# Patient Record
Sex: FEMALE | Race: WHITE | Employment: OTHER | URBAN - METROPOLITAN AREA
[De-identification: names, ages, dates, MRNs, and addresses within clinical notes are randomized per-mention and may not be internally consistent; named-entity substitution may affect disease eponyms.]

---

## 2017-02-09 ENCOUNTER — HOSPITAL ENCOUNTER (OUTPATIENT)
Dept: MAMMOGRAPHY | Age: 72
Discharge: HOME | End: 2017-02-09
Attending: SPECIALIST
Payer: MEDICARE

## 2017-02-09 DIAGNOSIS — Z12.31 VISIT FOR SCREENING MAMMOGRAM: ICD-10-CM

## 2017-02-09 PROCEDURE — 77067 SCR MAMMO BI INCL CAD: CPT

## 2018-02-15 ENCOUNTER — HOSPITAL ENCOUNTER (OUTPATIENT)
Dept: MAMMOGRAPHY | Age: 73
Discharge: HOME | End: 2018-02-15
Attending: INTERNAL MEDICINE
Payer: MEDICARE

## 2018-02-15 DIAGNOSIS — Z12.31 VISIT FOR SCREENING MAMMOGRAM: ICD-10-CM

## 2018-02-15 PROCEDURE — 77063 BREAST TOMOSYNTHESIS BI: CPT

## 2019-02-25 ENCOUNTER — HOSPITAL ENCOUNTER (OUTPATIENT)
Dept: MAMMOGRAPHY | Age: 74
Discharge: HOME | End: 2019-02-25
Attending: INTERNAL MEDICINE
Payer: MEDICARE

## 2019-02-25 DIAGNOSIS — Z12.39 BREAST SCREENING: ICD-10-CM

## 2019-02-25 PROCEDURE — 77063 BREAST TOMOSYNTHESIS BI: CPT

## 2020-09-03 ENCOUNTER — HOSPITAL ENCOUNTER (OUTPATIENT)
Dept: MAMMOGRAPHY | Age: 75
Discharge: HOME | End: 2020-09-03
Attending: INTERNAL MEDICINE
Payer: MEDICARE

## 2020-09-03 DIAGNOSIS — Z12.31 VISIT FOR SCREENING MAMMOGRAM: ICD-10-CM

## 2020-09-03 PROCEDURE — 77063 BREAST TOMOSYNTHESIS BI: CPT

## 2021-10-14 ENCOUNTER — TRANSCRIBE ORDER (OUTPATIENT)
Dept: SCHEDULING | Age: 76
End: 2021-10-14

## 2021-10-14 DIAGNOSIS — Z12.31 SCREENING MAMMOGRAM FOR HIGH-RISK PATIENT: Primary | ICD-10-CM

## 2021-11-16 ENCOUNTER — HOSPITAL ENCOUNTER (OUTPATIENT)
Dept: MAMMOGRAPHY | Age: 76
Discharge: HOME | End: 2021-11-16
Attending: INTERNAL MEDICINE
Payer: MEDICARE

## 2021-11-16 DIAGNOSIS — Z12.31 SCREENING MAMMOGRAM FOR HIGH-RISK PATIENT: ICD-10-CM

## 2021-11-16 PROCEDURE — 77063 BREAST TOMOSYNTHESIS BI: CPT

## 2022-01-18 ENCOUNTER — OFFICE VISIT (OUTPATIENT)
Dept: ORTHOPEDIC SURGERY | Age: 77
End: 2022-01-18
Payer: MEDICARE

## 2022-01-18 VITALS — WEIGHT: 149 LBS | HEIGHT: 63 IN | BODY MASS INDEX: 26.4 KG/M2

## 2022-01-18 DIAGNOSIS — M70.62 TROCHANTERIC BURSITIS OF LEFT HIP: ICD-10-CM

## 2022-01-18 DIAGNOSIS — M54.42 ACUTE BACK PAIN WITH SCIATICA, LEFT: ICD-10-CM

## 2022-01-18 DIAGNOSIS — M25.552 ACUTE PAIN OF LEFT HIP: Primary | ICD-10-CM

## 2022-01-18 PROCEDURE — 3017F COLORECTAL CA SCREEN DOC REV: CPT | Performed by: ORTHOPAEDIC SURGERY

## 2022-01-18 PROCEDURE — 1090F PRES/ABSN URINE INCON ASSESS: CPT | Performed by: ORTHOPAEDIC SURGERY

## 2022-01-18 PROCEDURE — G8419 CALC BMI OUT NRM PARAM NOF/U: HCPCS | Performed by: ORTHOPAEDIC SURGERY

## 2022-01-18 PROCEDURE — G8432 DEP SCR NOT DOC, RNG: HCPCS | Performed by: ORTHOPAEDIC SURGERY

## 2022-01-18 PROCEDURE — G8400 PT W/DXA NO RESULTS DOC: HCPCS | Performed by: ORTHOPAEDIC SURGERY

## 2022-01-18 PROCEDURE — 99203 OFFICE O/P NEW LOW 30 MIN: CPT | Performed by: ORTHOPAEDIC SURGERY

## 2022-01-18 PROCEDURE — G8427 DOCREV CUR MEDS BY ELIG CLIN: HCPCS | Performed by: ORTHOPAEDIC SURGERY

## 2022-01-18 PROCEDURE — G8536 NO DOC ELDER MAL SCRN: HCPCS | Performed by: ORTHOPAEDIC SURGERY

## 2022-01-18 PROCEDURE — 1101F PT FALLS ASSESS-DOCD LE1/YR: CPT | Performed by: ORTHOPAEDIC SURGERY

## 2022-01-18 NOTE — LETTER
1/18/2022    Patient: Nanci Mares   YOB: 1945   Date of Visit: 0/54/7372     Sylvia Hammond MD  [facility] [place] [place] [address]  Via Fax: [phone]    Dear Sylvia Hammond MD,      Thank you for referring Ms. Last Milian to [facility] for evaluation. My notes for this consultation are attached. If you have questions, please do not hesitate to call me. I look forward to following your patient along with you.       Sincerely,    Cande Cox MD

## 2022-01-18 NOTE — PATIENT INSTRUCTIONS
Trochanteric Bursitis: Exercises  Introduction  Here are some examples of exercises for you to try. The exercises may be suggested for a condition or for rehabilitation. Start each exercise slowly. Ease off the exercises if you start to have pain. You will be told when to start these exercises and which ones will work best for you. How to do the exercises  Hamstring wall stretch    1. Lie on your back in a doorway, with your good leg through the open door. 2. Slide your affected leg up the wall to straighten your knee. You should feel a gentle stretch down the back of your leg. 3. Hold the stretch for at least 1 minute to begin. Then try to lengthen the time you hold the stretch to as long as 6 minutes. 4. Repeat 2 to 4 times. 5. If you do not have a place to do this exercise in a doorway, there is another way to do it:  6. Lie on your back, and bend the knee of your affected leg. 7. Loop a towel under the ball and toes of that foot, and hold the ends of the towel in your hands. 8. Straighten your knee, and slowly pull back on the towel. You should feel a gentle stretch down the back of your leg. 9. Hold the stretch for 15 to 30 seconds. Or even better, hold the stretch for 1 minute if you can. 10. Repeat 2 to 4 times. 1. Do not arch your back. 2. Do not bend either knee. 3. Keep one heel touching the floor and the other heel touching the wall. Do not point your toes. Straight-leg raises to the outside    1. Lie on your side, with your affected leg on top. 2. Tighten the front thigh muscles of your top leg to keep your knee straight. 3. Keep your hip and your leg straight in line with the rest of your body, and keep your knee pointing forward. Do not drop your hip back. 4. Lift your top leg straight up toward the ceiling, about 12 inches off the floor. Hold for about 6 seconds, then slowly lower your leg. 5. Repeat 8 to 12 times. Clamshell    1.  Lie on your side, with your affected leg on top and your head propped on a pillow. Keep your feet and knees together and your knees bent. 2. Raise your top knee, but keep your feet together. Do not let your hips roll back. Your legs should open up like a clamshell. 3. Hold for 6 seconds. 4. Slowly lower your knee back down. Rest for 10 seconds. 5. Repeat 8 to 12 times. Standing quadriceps stretch    1. If you are not steady on your feet, hold on to a chair, counter, or wall. You can also lie on your stomach or your side to do this exercise. 2. Bend the knee of the leg you want to stretch, and reach behind you to grab the front of your foot or ankle with the hand on the same side. For example, if you are stretching your right leg, use your right hand. 3. Keeping your knees next to each other, pull your foot toward your buttock until you feel a gentle stretch across the front of your hip and down the front of your thigh. Your knee should be pointed directly to the ground, and not out to the side. 4. Hold the stretch for 15 to 30 seconds. 5. Repeat 2 to 4 times. Piriformis stretch    1. Lie on your back with your legs straight. 2. Lift your affected leg and bend your knee. With your opposite hand, reach across your body, and then gently pull your knee toward your opposite shoulder. 3. Hold the stretch for 15 to 30 seconds. 4. Repeat 2 to 4 times. Double knee-to-chest    1. Lie on your back with your knees bent and your feet flat on the floor. You can put a small pillow under your head and neck if it is more comfortable. 2. Bring both knees to your chest.  3. Keep your lower back pressed to the floor. Hold for 15 to 30 seconds. 4. Relax, and lower your knees to the starting position. 5. Repeat 2 to 4 times. Follow-up care is a key part of your treatment and safety. Be sure to make and go to all appointments, and call your doctor if you are having problems.  It's also a good idea to know your test results and keep a list of the medicines you take.  Where can you learn more? Go to http://www.gray.com/  Enter N503 in the search box to learn more about \"Trochanteric Bursitis: Exercises. \"  Current as of: July 1, 2021               Content Version: 13.0  © 7568-9840 [employer], [employer]. Care instructions adapted under license by [employer] (which disclaims liability or warranty for this information). If you have questions about a medical condition or this instruction, always ask your healthcare professional. [address] any warranty or liability for your use of this information.

## 2022-01-18 NOTE — PROGRESS NOTES
Calin Carty (: 1945) is a 78 y.o. female, patient, here for evaluation of the following chief complaint(s):  Hip Pain (left)       HPI:    Chief complaint is left hip pain. 3 weeks left hip pain. Feels it in her buttock possibly into her lateral hip. Does not really bother her walking. Seems to be resolving. No Known Allergies    Current Outpatient Medications   Medication Sig    naproxen sodium 220 mg cap Take 220 mg by mouth daily.  VITAMIN B COMPLEX PO Take  by mouth. Takes one po occasionally. Super B complex.  levothyroxine (SYNTHROID) 50 mcg tablet Take 50 mcg by mouth Daily (before breakfast).  ranitidine (ZANTAC) 150 mg tablet Take 150 mg by mouth two (2) times a day. No current facility-administered medications for this visit.        Past Medical History:   Diagnosis Date    GERD (gastroesophageal reflux disease)     Hypothyroidism     Intraductal papilloma 2012    Migraine     RA (rheumatoid arthritis) (HCC)     remission        Past Surgical History:   Procedure Laterality Date    HX BREAST BIOPSY Right     benign surgical- papilloma    HX BREAST BIOPSY Right 2012    ultrasound core benign-papilloma    HX  SECTION  1983    HX DILATION AND CURETTAGE      HX OTHER SURGICAL      [place] RECTAL ABCESS    HX OTHER SURGICAL      last colonoscopy -        Family History   Problem Relation Age of Onset    Thyroid Disease Mother     Alzheimer's Disease Mother     Other Mother         pneumonia    Cancer Father         PROSTATE    Cancer Paternal Grandmother         THYROID    Diabetes Son         Social History     Socioeconomic History    Marital status:      Spouse name: Not on file    Number of children: Not on file    Years of education: Not on file    Highest education level: Not on file   Occupational History    Not on file   Tobacco Use    Smoking status: Former Smoker    Smokeless tobacco: Never Used    Tobacco comment: quit at age 29 yrs   Substance and Sexual Activity    Alcohol use: Yes     Comment: OCCASIONAL    Drug use: Not on file    Sexual activity: Not on file   Other Topics Concern    Not on file   Social History Narrative    Not on file     Social Determinants of Health     Financial Resource Strain:     Difficulty of Paying Living Expenses: Not on file   Food Insecurity:     Worried About Running Out of Food in the Last Year: Not on file    Re of Food in the Last Year: Not on file   Transportation Needs:     Lack of Transportation (Medical): Not on file    Lack of Transportation (Non-Medical): Not on file   Physical Activity:     Days of Exercise per Week: Not on file    Minutes of Exercise per Session: Not on file   Stress:     Feeling of Stress : Not on file   Social Connections:     Frequency of Communication with Friends and Family: Not on file    Frequency of Social Gatherings with Friends and Family: Not on file    Attends Zoroastrian Services: Not on file    Active Member of [address] [place] [address] or Organizations: Not on file    Attends Club or Organization Meetings: Not on file    Marital Status: Not on file   Intimate Partner Violence:     Fear of Current or Ex-Partner: Not on file    Emotionally Abused: Not on file    Physically Abused: Not on file    Sexually Abused: Not on file   Housing Stability:     Unable to Pay for Housing in the Last Year: Not on file    Number of [place] in the Last Year: Not on file    Unstable Housing in the Last Year: Not on file             Vitals:  Ht 5' 3\" (1.6 m)   Wt 149 lb (67.6 kg)   BMI 26.39 kg/m²    Body mass index is 26.39 kg/m². PHYSICAL EXAM:  Patient is well-developed well-nourished and in no acute distress. Patient ambulates with a normal gait. No focal neurologic abnormalities.     Left lower extremity exam: Hip exam today reveals negative logroll test and negative impingement test.  Hip range of motion is full extension to 115 degrees of flexion. There is mild trochanteric tenderness. Hip flexors and abductors have 5 x 5 strength. Patient able to stand on the affected lower extremity with negative Trendelenburg. Extremity has intact quads, ankle plantar flexors, ankle dorsiflexors. Skin is intact. Foot is sensate and well perfused. Straight leg raise and femoral nerve stretch test are negative. Right lower extremity exam: Hip exam today reveals negative logroll test and negative impingement test.  Hip range of motion is full extension to 115 degrees of flexion. There is no trochanteric tenderness. Hip flexors and abductors have 5 x 5 strength. Patient able to stand on the affected lower extremity with negative Trendelenburg. Extremity has intact quads, ankle plantar flexors, ankle dorsiflexors. Skin is intact. Foot is sensate and well perfused. Straight leg raise and femoral nerve stretch test are negative. IMAGING:  XR Results (most recent):  Results from Appointment encounter on 01/18/22    XR HIP LT W OR WO PELV 2-3 VWS    Narrative  3 x-ray views of the left hip. AP pelvis, lateral left hip, AP left hip. Joint space well-maintained small posterior inferior osteophyte noted. No acute issues. Lumbar degenerative disc space narrowing. ASSESSMENT/PLAN:  1. Acute pain of left hip  -     XR HIP LT W OR WO PELV 2-3 VWS; Future  2. Trochanteric bursitis of left hip  3. Acute back pain with sciatica, left    Reassured the patient that there are no abnormal findings on her x-rays. She will use some over-the-counter medications and home stretching program.  Follow-up as needed. An electronic signature was used to authenticate this note.   --Carolyn Méndez MD

## 2022-11-01 ENCOUNTER — TRANSCRIBE ORDER (OUTPATIENT)
Dept: SCHEDULING | Age: 77
End: 2022-11-01

## 2022-11-01 DIAGNOSIS — Z12.31 SCREENING MAMMOGRAM FOR HIGH-RISK PATIENT: Primary | ICD-10-CM

## 2023-01-06 ENCOUNTER — APPOINTMENT (OUTPATIENT)
Dept: CT IMAGING | Age: 78
End: 2023-01-06
Attending: EMERGENCY MEDICINE
Payer: MEDICARE

## 2023-01-06 ENCOUNTER — HOSPITAL ENCOUNTER (EMERGENCY)
Age: 78
Discharge: HOME | End: 2023-01-06
Attending: EMERGENCY MEDICINE
Payer: MEDICARE

## 2023-01-06 ENCOUNTER — APPOINTMENT (OUTPATIENT)
Dept: CT IMAGING | Age: 78
End: 2023-01-06
Payer: MEDICARE

## 2023-01-06 VITALS
WEIGHT: 146.61 LBS | SYSTOLIC BLOOD PRESSURE: 122 MMHG | TEMPERATURE: 97.8 F | HEIGHT: 62 IN | BODY MASS INDEX: 26.98 KG/M2 | DIASTOLIC BLOOD PRESSURE: 51 MMHG | OXYGEN SATURATION: 98 % | RESPIRATION RATE: 18 BRPM | HEART RATE: 55 BPM

## 2023-01-06 DIAGNOSIS — T14.8XXA ABRASION: ICD-10-CM

## 2023-01-06 DIAGNOSIS — S09.90XA CLOSED HEAD INJURY, INITIAL ENCOUNTER: Primary | ICD-10-CM

## 2023-01-06 PROCEDURE — 72125 CT NECK SPINE W/O DYE: CPT

## 2023-01-06 PROCEDURE — 70486 CT MAXILLOFACIAL W/O DYE: CPT

## 2023-01-06 PROCEDURE — 99284 EMERGENCY DEPT VISIT MOD MDM: CPT

## 2023-01-06 PROCEDURE — 70450 CT HEAD/BRAIN W/O DYE: CPT

## 2023-01-06 NOTE — ED TRIAGE NOTES
Pt arrives c/o falling today. Pt arrives with friend who witnessed the event. The friend states her foot got caught on the brick next to the sidewalk and fell face forward. Pt and friend deny LOC. -blood thinners. Pt baseline has memory issues and difficulty finding words. No bleeding noted. Some bruising noted to chin.

## 2023-01-07 NOTE — DISCHARGE INSTRUCTIONS
As discussed, your CT of your head, face, and neck were all normal. You may take Ibuprofen 800mg every 6-8 hours as needed for pain. You may also alternate with Tylenol 1000mg every 6-8 hours as needed for pain. You may use over-the-counter ointment such as Neosporin on your chin. Follow-up with your primary care provider for further management. Return to the ER if you experience severe headaches, dizziness, visual changes, changes to your mental status.

## 2023-01-07 NOTE — ED PROVIDER NOTES
The history is provided by the patient and a friend. Fall  Pertinent negatives include no fever, no abdominal pain, no nausea, no vomiting and no headaches. David Shah is a 78 y.o. female with Hx of migraines, RA, and hypothyroidism who presents ambulatory with a friend to [facility] ED with cc of mechanical ground-level fall earlier today. Patient reports toe catching on a brick resulting in ground-level fall, hitting her face on the ground. Fall was witnessed by her friend that is with her today. Patient reports mild intermittent pain in her head and face, no pain currently. She reports mild skin abrasion on chin, applied Neosporin prior to arrival.  Otherwise, no pain or injuries reported. No loss of consciousness, dizziness, chest pain, shortness of breath. Denies use of aspirin or blood thinners. Patient has some difficulty with memory and word finding at baseline, but friend reports no acute change in mental status. PCP: Miranda Thomas MD    There are no other complaints, changes or physical findings at this time.     Past Medical History:   Diagnosis Date    GERD (gastroesophageal reflux disease)     Hypothyroidism     Intraductal papilloma 2012    Migraine     RA (rheumatoid arthritis) ([facility] [address]     remission       Past Surgical History:   Procedure Laterality Date    HX BREAST BIOPSY Right 2012    benign surgical- papilloma    HX BREAST BIOPSY Right 2012    ultrasound core benign-papilloma    HX  SECTION  1983    HX DILATION AND CURETTAGE      HX OTHER SURGICAL      DONNA RECTAL ABCESS    HX OTHER SURGICAL      last colonoscopy -          Family History:   Problem Relation Age of Onset    Thyroid Disease Mother     Alzheimer's Disease Mother     Other Mother         pneumonia    Cancer Father         PROSTATE    Cancer Paternal Grandmother         THYROID    Diabetes Son        Social History     Socioeconomic History    Marital status:      Spouse name: Not on file    Number of children: Not on file    Years of education: Not on file    Highest education level: Not on file   Occupational History    Not on file   Tobacco Use    Smoking status: Former    Smokeless tobacco: Never    Tobacco comments:     quit at age 29 yrs   Substance and Sexual Activity    Alcohol use: Yes     Comment: OCCASIONAL    Drug use: Not on file    Sexual activity: Not on file   Other Topics Concern    Not on file   Social History Narrative    Not on file     Social Determinants of Health     Financial Resource Strain: Not on file   Food Insecurity: Not on file   Transportation Needs: Not on file   Physical Activity: Not on file   Stress: Not on file   Social Connections: Not on file   Intimate Partner Violence: Not on file   Housing Stability: Not on file         ALLERGIES: Patient has no known allergies. Review of Systems   Constitutional:  Negative for activity change, appetite change, chills and fever. HENT:  Negative for congestion, rhinorrhea and sore throat. Respiratory:  Negative for cough and shortness of breath. Cardiovascular:  Negative for chest pain. Gastrointestinal:  Negative for abdominal pain, nausea and vomiting. Genitourinary:  Negative for decreased urine volume and difficulty urinating. Musculoskeletal:  Negative for arthralgias and myalgias. Skin:  Positive for wound. Negative for color change and rash. Neurological:  Negative for light-headedness and headaches. Psychiatric/Behavioral:  Negative for agitation and confusion. The patient is not nervous/anxious. Vitals:    01/06/23 1741   BP: (!) 122/51   Pulse: (!) 55   Resp: 18   Temp: 97.8 °F (36.6 °C)   SpO2: 98%   Weight: 66.5 kg (146 lb 9.7 oz)   Height: 5' 2\" (1.575 m)            Physical Exam  Vitals and nursing note reviewed. Constitutional:       Appearance: Normal appearance. HENT:      Head: Normocephalic. Abrasion (R chin) present. No masses.       Comments: Skull nontender to palpation      Right Ear: External ear normal.      Left Ear: External ear normal.      Nose: Nose normal.      Mouth/Throat:      Mouth: Mucous membranes are moist.   Eyes:      Extraocular Movements: Extraocular movements intact. Conjunctiva/sclera: Conjunctivae normal.      Pupils: Pupils are equal, round, and reactive to light. Cardiovascular:      Rate and Rhythm: Normal rate and regular rhythm. Pulses: Normal pulses. Heart sounds: Normal heart sounds. Pulmonary:      Effort: Pulmonary effort is normal.      Breath sounds: Normal breath sounds. Abdominal:      Palpations: Abdomen is soft. Musculoskeletal:         General: No tenderness, deformity or signs of injury. Normal range of motion. Cervical back: Normal range of motion and neck supple. No rigidity or tenderness. Comments: No tenderness, deformity, or injury to knees, wrists, and shoulders bilaterally. Small bruise on L knee. Skin:     General: Skin is warm and dry. Capillary Refill: Capillary refill takes less than 2 seconds. Neurological:      General: No focal deficit present. Mental Status: She is alert and oriented to person, place, and time. Mental status is at baseline. Motor: No weakness. Coordination: Coordination normal.      Gait: Gait normal.   Psychiatric:         Mood and Affect: Mood normal.         Behavior: Behavior normal.         Thought Content: Thought content normal.         Judgment: Judgment normal.        Medical Decision Making  Ddx: Closed head injury, contusion of chin, concussion    68-year-old female presents after witnessed mechanical ground-level fall today resulting in hitting her face on the ground. On exam, mild skin abrasion on chin which does not require further management. Offered pain medication for head/face pain, patient declined.   Based on age, mechanism of fall and patient's difficulty with memory and word finding at baseline, felt that imaging was warranted. Work-up included CT head, maxillofacial, and cervical spine; all unremarkable. Discussed with attending Dr. Sarah Benjamin who is comfortable with discharge with Tylenol, ibuprofen, strict return precautions, and follow-up with primary care provider. Amount and/or Complexity of Data Reviewed  Independent Historian: friend  Radiology: ordered. Decision-making details documented in ED Course. Procedures    LABORATORY TESTS:  No results found for this or any previous visit (from the past 12 hour(s)). IMAGING RESULTS:  CT MAXILLOFACIAL WO CONT   Final Result   No fracture. CT SPINE CERV WO CONT   Final Result   No acute fracture or subluxation. CT HEAD WO CONT   Final Result   No acute intracranial hemorrhage or infarct. Chronic microangiopathic white matter disease and diffuse parenchymal volume   loss. MEDICATIONS GIVEN:  Medications - No data to display    IMPRESSION:  1. Closed head injury, initial encounter    2. Abrasion        PLAN:  1. Current Discharge Medication List      Recommended Tylenol, ibuprofen, OTC antibiotic ointment    2. Follow-up Information       Follow up With Specialties Details Why Contact Info    [address], [facility] Emergency Medicine Go to  As needed, If symptoms worsen [address] [facility]    Last Fierro MD Internal Medicine Physician Go to  As needed [address] [id number] [phone]            3. Return to ED if worse     Presentation, management, and disposition were discussed with the attending physician, Dr. Sarah Benjamin, who is in agreement with plan of care.

## 2023-01-18 ENCOUNTER — HOSPITAL ENCOUNTER (OUTPATIENT)
Dept: MAMMOGRAPHY | Age: 78
Discharge: HOME | End: 2023-01-18
Attending: INTERNAL MEDICINE
Payer: MEDICARE

## 2023-01-18 DIAGNOSIS — Z12.31 SCREENING MAMMOGRAM FOR HIGH-RISK PATIENT: ICD-10-CM

## 2023-01-18 PROCEDURE — 77063 BREAST TOMOSYNTHESIS BI: CPT

## 2023-02-08 ENCOUNTER — APPOINTMENT (OUTPATIENT)
Dept: CT IMAGING | Age: 78
End: 2023-02-08
Attending: STUDENT IN AN ORGANIZED HEALTH CARE EDUCATION/TRAINING PROGRAM
Payer: MEDICARE

## 2023-02-08 ENCOUNTER — HOSPITAL ENCOUNTER (OUTPATIENT)
Age: 78
Setting detail: OBSERVATION
Discharge: HOME HEALTH | End: 2023-02-09
Attending: STUDENT IN AN ORGANIZED HEALTH CARE EDUCATION/TRAINING PROGRAM | Admitting: INTERNAL MEDICINE
Payer: MEDICARE

## 2023-02-08 DIAGNOSIS — G45.9 TIA (TRANSIENT ISCHEMIC ATTACK): Primary | ICD-10-CM

## 2023-02-08 LAB
ALBUMIN SERPL-MCNC: 4 G/DL (ref 3.5–5)
ALBUMIN/GLOB SERPL: 1 (ref 1.1–2.2)
ALP SERPL-CCNC: 64 U/L (ref 45–117)
ALT SERPL-CCNC: 20 U/L (ref 12–78)
ANION GAP SERPL CALC-SCNC: 10 MMOL/L (ref 5–15)
AST SERPL-CCNC: 25 U/L (ref 15–37)
ATRIAL RATE: 49 BPM
BASOPHILS # BLD: 0.1 K/UL (ref 0–0.1)
BASOPHILS NFR BLD: 1 % (ref 0–1)
BILIRUB SERPL-MCNC: 0.8 MG/DL (ref 0.2–1)
BUN SERPL-MCNC: 13 MG/DL (ref 6–20)
BUN/CREAT SERPL: 15 (ref 12–20)
CALCIUM SERPL-MCNC: 9.5 MG/DL (ref 8.5–10.1)
CALCULATED P AXIS, ECG09: 76 DEGREES
CALCULATED R AXIS, ECG10: 22 DEGREES
CALCULATED T AXIS, ECG11: 64 DEGREES
CHLORIDE SERPL-SCNC: 103 MMOL/L (ref 97–108)
CO2 SERPL-SCNC: 26 MMOL/L (ref 21–32)
CREAT SERPL-MCNC: 0.85 MG/DL (ref 0.55–1.02)
DIAGNOSIS, 93000: NORMAL
DIFFERENTIAL METHOD BLD: NORMAL
EOSINOPHIL # BLD: 0.3 K/UL (ref 0–0.4)
EOSINOPHIL NFR BLD: 4 % (ref 0–7)
ERYTHROCYTE [DISTWIDTH] IN BLOOD BY AUTOMATED COUNT: 13.2 % (ref 11.5–14.5)
GLOBULIN SER CALC-MCNC: 4 G/DL (ref 2–4)
GLUCOSE BLD STRIP.AUTO-MCNC: 99 MG/DL (ref 65–117)
GLUCOSE SERPL-MCNC: 95 MG/DL (ref 65–100)
HCT VFR BLD AUTO: 39.1 % (ref 35–47)
HGB BLD-MCNC: 12.8 G/DL (ref 11.5–16)
IMM GRANULOCYTES # BLD AUTO: 0 K/UL (ref 0–0.04)
IMM GRANULOCYTES NFR BLD AUTO: 0 % (ref 0–0.5)
INR PPP: 1 (ref 0.9–1.1)
LYMPHOCYTES # BLD: 2 K/UL (ref 0.8–3.5)
LYMPHOCYTES NFR BLD: 30 % (ref 12–49)
MCH RBC QN AUTO: 30.1 PG (ref 26–34)
MCHC RBC AUTO-ENTMCNC: 32.7 G/DL (ref 30–36.5)
MCV RBC AUTO: 92 FL (ref 80–99)
MONOCYTES # BLD: 0.5 K/UL (ref 0–1)
MONOCYTES NFR BLD: 8 % (ref 5–13)
NEUTS SEG # BLD: 3.9 K/UL (ref 1.8–8)
NEUTS SEG NFR BLD: 57 % (ref 32–75)
NRBC # BLD: 0 K/UL (ref 0–0.01)
NRBC BLD-RTO: 0 PER 100 WBC
P-R INTERVAL, ECG05: 134 MS
PLATELET # BLD AUTO: 298 K/UL (ref 150–400)
PMV BLD AUTO: 9.7 FL (ref 8.9–12.9)
POTASSIUM SERPL-SCNC: 4.2 MMOL/L (ref 3.5–5.1)
PROT SERPL-MCNC: 8 G/DL (ref 6.4–8.2)
PROTHROMBIN TIME: 10.2 SEC (ref 9–11.1)
Q-T INTERVAL, ECG07: 446 MS
QRS DURATION, ECG06: 70 MS
QTC CALCULATION (BEZET), ECG08: 402 MS
RBC # BLD AUTO: 4.25 M/UL (ref 3.8–5.2)
SERVICE CMNT-IMP: NORMAL
SODIUM SERPL-SCNC: 139 MMOL/L (ref 136–145)
VENTRICULAR RATE, ECG03: 49 BPM
WBC # BLD AUTO: 6.8 K/UL (ref 3.6–11)

## 2023-02-08 PROCEDURE — 74011250636 HC RX REV CODE- 250/636: Performed by: STUDENT IN AN ORGANIZED HEALTH CARE EDUCATION/TRAINING PROGRAM

## 2023-02-08 PROCEDURE — 99285 EMERGENCY DEPT VISIT HI MDM: CPT

## 2023-02-08 PROCEDURE — 74011250637 HC RX REV CODE- 250/637: Performed by: STUDENT IN AN ORGANIZED HEALTH CARE EDUCATION/TRAINING PROGRAM

## 2023-02-08 PROCEDURE — 74011000636 HC RX REV CODE- 636: Performed by: STUDENT IN AN ORGANIZED HEALTH CARE EDUCATION/TRAINING PROGRAM

## 2023-02-08 PROCEDURE — 36415 COLL VENOUS BLD VENIPUNCTURE: CPT

## 2023-02-08 PROCEDURE — 80053 COMPREHEN METABOLIC PANEL: CPT

## 2023-02-08 PROCEDURE — 70450 CT HEAD/BRAIN W/O DYE: CPT

## 2023-02-08 PROCEDURE — 94762 N-INVAS EAR/PLS OXIMTRY CONT: CPT

## 2023-02-08 PROCEDURE — 93005 ELECTROCARDIOGRAM TRACING: CPT

## 2023-02-08 PROCEDURE — 85025 COMPLETE CBC W/AUTO DIFF WBC: CPT

## 2023-02-08 PROCEDURE — 85610 PROTHROMBIN TIME: CPT

## 2023-02-08 PROCEDURE — G0378 HOSPITAL OBSERVATION PER HR: HCPCS

## 2023-02-08 PROCEDURE — 70496 CT ANGIOGRAPHY HEAD: CPT

## 2023-02-08 PROCEDURE — 82962 GLUCOSE BLOOD TEST: CPT

## 2023-02-08 RX ORDER — CLOPIDOGREL BISULFATE 75 MG/1
300 TABLET ORAL
Status: COMPLETED | OUTPATIENT
Start: 2023-02-08 | End: 2023-02-08

## 2023-02-08 RX ORDER — GUAIFENESIN 100 MG/5ML
325 LIQUID (ML) ORAL
Status: COMPLETED | OUTPATIENT
Start: 2023-02-08 | End: 2023-02-08

## 2023-02-08 RX ADMIN — CLOPIDOGREL BISULFATE 300 MG: 75 TABLET ORAL at 12:48

## 2023-02-08 RX ADMIN — SODIUM CHLORIDE 1000 ML: 9 INJECTION, SOLUTION INTRAVENOUS at 12:50

## 2023-02-08 RX ADMIN — IOPAMIDOL 100 ML: 755 INJECTION, SOLUTION INTRAVENOUS at 12:37

## 2023-02-08 RX ADMIN — ASPIRIN 324 MG: 81 TABLET, CHEWABLE ORAL at 12:47

## 2023-02-08 NOTE — ED NOTES
SBAR report given to [facility]. Patient alert, oriented, and in no distress at time of transport.

## 2023-02-08 NOTE — ED TRIAGE NOTES
Pt arrives with sudden onset @ 11:30 of R sided jaw weakness, R arm numbness and weakness as well as R leg numbness and weakness. Pt with floppy extremities to R arm and R leg that improved during CT scan and is back to normal , per pt and family member at bedside.  Pt has baseline dysphagia which has not gotten worse and pt has had \"for years\"

## 2023-02-08 NOTE — ED NOTES
1130: Last known normal @ 1130.  1145: level 1 code S called  1145: Dr. Kirsten Ruiz at bedside  [phone]: pt taken to CT scan.

## 2023-02-08 NOTE — ED NOTES
TRANSFER - OUT REPORT:    Verbal report given to Juliette Briggs RN (name) on [place]  being transferred to NSTU (unit) for routine progression of care       Report consisted of patients Situation, Background, Assessment and   Recommendations(SBAR). Information from the following report(s) SBAR, ED Summary, MAR, Recent Results, and Cardiac Rhythm Sinus Bradycardia  was reviewed with the receiving nurse. Lines:   Peripheral IV 02/08/23 Left Antecubital (Active)        Opportunity for questions and clarification was provided.       Patient transported with:   Monitor

## 2023-02-08 NOTE — ED PROVIDER NOTES
AlsoHPI     Date of Service:  2023    Patient:  Mk Houser    Chief Complaint:  Weakness       HPI:  kM Houser is a 70 y.o.  female with a past medical history of hypothyroidism, RA, migraines, GERD who presents for evaluation of weakness. Patient notes approximately 20 minutes prior to arrival she developed sudden onset right-sided weakness and paresthesias, LKW approx 11:15am.  Denies any pain complaints. No other recent illness.       Past Medical History:   Diagnosis Date    GERD (gastroesophageal reflux disease)     Hypothyroidism     Intraductal papilloma 2012    Migraine     RA (rheumatoid arthritis) (HCC)     remission       Past Surgical History:   Procedure Laterality Date    HX BREAST BIOPSY Right 2012    benign surgical- papilloma    HX BREAST BIOPSY Right 2012    ultrasound core benign-papilloma    HX  SECTION  1983    HX DILATION AND CURETTAGE      HX OTHER SURGICAL      DONNA RECTAL ABCESS    HX OTHER SURGICAL      last colonoscopy -          Family History:   Problem Relation Age of Onset    Thyroid Disease Mother     Alzheimer's Disease Mother     Other Mother         pneumonia    Cancer Father         PROSTATE    Cancer Paternal Grandmother         THYROID    Diabetes Son        Social History     Socioeconomic History    Marital status:      Spouse name: Not on file    Number of children: Not on file    Years of education: Not on file    Highest education level: Not on file   Occupational History    Not on file   Tobacco Use    Smoking status: Former    Smokeless tobacco: Never    Tobacco comments:     quit at age 29 yrs   Substance and Sexual Activity    Alcohol use: Yes     Comment: OCCASIONAL    Drug use: Not on file    Sexual activity: Not on file   Other Topics Concern    Not on file   Social History Narrative    Not on file     Social Determinants of Health     Financial Resource Strain: Not on file   Food Insecurity: Not on file Transportation Needs: Not on file   Physical Activity: Not on file   Stress: Not on file   Social Connections: Not on file   Intimate Partner Violence: Not on file   Housing Stability: Not on file         ALLERGIES: Patient has no known allergies. Review of Systems   Constitutional:  Negative for chills and fever. HENT:  Negative for congestion and rhinorrhea. Eyes:  Negative for discharge and redness. Respiratory:  Negative for cough and shortness of breath. Gastrointestinal:  Negative for abdominal pain, diarrhea, nausea and vomiting. Neurological:  Positive for speech difficulty and weakness. Psychiatric/Behavioral:  Negative for agitation and confusion. There were no vitals filed for this visit. Physical Exam  Vitals and nursing note reviewed. Constitutional:       General: She is not in acute distress. Appearance: Normal appearance. She is not ill-appearing or toxic-appearing. HENT:      Head: Normocephalic and atraumatic. Eyes:      General: No scleral icterus. Extraocular Movements: Extraocular movements intact. Conjunctiva/sclera: Conjunctivae normal.      Pupils: Pupils are equal, round, and reactive to light. Cardiovascular:      Rate and Rhythm: Normal rate. Pulses: Normal pulses. Pulmonary:      Effort: Pulmonary effort is normal. No respiratory distress. Abdominal:      General: Abdomen is flat. Palpations: Abdomen is soft. Tenderness: There is no abdominal tenderness. Musculoskeletal:         General: Normal range of motion. Skin:     General: Skin is warm and dry. Capillary Refill: Capillary refill takes less than 2 seconds. Neurological:      Mental Status: She is alert and oriented to person, place, and time.       Comments: RESULT SUMMARY:  2 points  NIH Stroke Scale      INPUTS:  1A: Level of consciousness -> 0 = Alert; keenly responsive  1B: Ask month and age -> 0 = Both questions right  1C: 'Blink eyes' & 'squeeze hands' -> 0 = Performs both tasks  2: Horizontal extraocular movements -> 0 = Normal  3: Visual fields -> 0 = No visual loss  4: Facial palsy -> 0 = Normal symmetry  5A: Left arm motor drift -> 0 = No drift for 10 seconds  5B: Right arm motor drift -> 1 = Drift, but doesn't hit bed  6A: Left leg motor drift -> 0 = No drift for 5 seconds  6B: Right leg motor drift -> 0 = No drift for 5 seconds  7: Limb Ataxia -> 0 = No ataxia  8: Sensation -> 0 = Normal; no sensory loss  9: Language/aphasia -> 1 = Mild-moderate aphasia: some obvious changes, without significant limitation  10: Dysarthria -> 0 = Normal  11: Extinction/inattention -> 0 = No abnormality   Psychiatric:         Mood and Affect: Mood normal.         Behavior: Behavior normal.        Medical Decision Making      DECISION MAKING:  Patrick Aggarwal is a 70 y.o. female who comes in as above. On arrival, blood pressure is stable at 135/85. Point-of-care glucose on arrival is normal at 99. On my examination she does have some mild aphasia, there is right upper extremity pronator drift. NIHSS is 2. Patient was activated as a level 1 code stroke. She is within the window for TNK but will discuss with tele neurology given her low NIHSS score. I spoke with Dr. Jolly Nuñez, tele neuro, who will evaluate the patient. When patient returned from CT scan, her weakness has subsequently resolved. A patient's friend is now at bedside who is with her when symptoms started, she notes patient has chronic aphasia over the last year, no formal diagnosis has been made regarding this but her current speech is unchanged from baseline. Given patient symptoms are resolving, the aphasia is chronic it was decided that she is no longer a TNK candidate. Patient will go for CTA of the head and neck. She will be admitted for TIA work-up. Laboratory work is unremarkable. CTA of the head and the neck is without any significant stenosis or aneurysm.   I discussed results with patient and daughter at bedside. Discussed plan for transfer to [facility] for admission. They are in agreement. Patient's case was subsequently discussed with the hospitalist, Dr. Abimbola Bruner, at [facility] for admission. Perfect Serve Consult for Admission  2:11 PM    ED Room Number: SER09/09  Patient Name and age:  Bernardo Ortega 70 y.o.  female  Working Diagnosis: TIA (transient ischemic attack)  (primary encounter diagnosis)    COVID-19 Suspicion:  no  Sepsis present:  no  Reassessment needed: no  Code Status:  Full Code  Readmission: no  Isolation Requirements:  no  Recommended Level of Care:  telemetry  Department: [place] ED - [phone]  Admitting Provider: Ray Dixon    Other: 45-year-old female with a history of hypothyroidism, RA presenting for right-sided weakness and paresthesias. Symptom onset approximately 11:15 AM.  On my examination she had aphasia and right upper extremity weakness with pronator drift. NIHSS of 2. On patient's evaluation with telemetry neurology after CT head, symptoms were resolving therefore decided against TNK. Of note she has chronic aphasia which has been worsening over the last 1 year per her daughter. The weakness was new today and has since resolved. She was given ASA and plavix. Amount and/or Complexity of Data Reviewed  Labs: ordered. Decision-making details documented in ED Course. Radiology: ordered. Decision-making details documented in ED Course. ECG/medicine tests: ordered and independent interpretation performed. Decision-making details documented in ED Course. Risk  OTC drugs. Prescription drug management. Decision regarding hospitalization. ED Course as of 02/08/23 1407   Wed Feb 08, 2023   1232 EKG, 12 LEAD, INITIAL  ECG at 12:24 PM, interpreted by me: Sinus bradycardia, rate 49 bpm.  Normal axis. Normal intervals. No ST elevations.  [SH]      ED Course User Index  [SH] Yamil Longoria DO       Procedures      LABS:  Recent Results (from the past 6 hour(s))   GLUCOSE, POC    Collection Time: 02/08/23 11:57 AM   Result Value Ref Range    Glucose (POC) 99 65 - 117 mg/dL    Performed by Marquita Khan RN    EKG, 12 LEAD, INITIAL    Collection Time: 02/08/23 12:24 PM   Result Value Ref Range    Ventricular Rate 49 BPM    Atrial Rate 49 BPM    P-R Interval 134 ms    QRS Duration 70 ms    Q-T Interval 446 ms    QTC Calculation (Bezet) 402 ms    Calculated P Axis 76 degrees    Calculated R Axis 22 degrees    Calculated T Axis 64 degrees    Diagnosis       Sinus bradycardia  Otherwise normal ECG  When compared with ECG of 07-DEC-2012 13:13,  No significant change was found     CBC WITH AUTOMATED DIFF    Collection Time: 02/08/23 12:26 PM   Result Value Ref Range    WBC 6.8 3.6 - 11.0 K/uL    RBC 4.25 3.80 - 5.20 M/uL    HGB 12.8 11.5 - 16.0 g/dL    HCT 39.1 35.0 - 47.0 %    MCV 92.0 80.0 - 99.0 FL    MCH 30.1 26.0 - 34.0 PG    MCHC 32.7 30.0 - 36.5 g/dL    RDW 13.2 11.5 - 14.5 %    PLATELET 279 909 - 987 K/uL    MPV 9.7 8.9 - 12.9 FL    NRBC 0.0 0  WBC    ABSOLUTE NRBC 0.00 0.00 - 0.01 K/uL    NEUTROPHILS 57 32 - 75 %    LYMPHOCYTES 30 12 - 49 %    MONOCYTES 8 5 - 13 %    EOSINOPHILS 4 0 - 7 %    BASOPHILS 1 0 - 1 %    IMMATURE GRANULOCYTES 0 0.0 - 0.5 %    ABS. NEUTROPHILS 3.9 1.8 - 8.0 K/UL    ABS. LYMPHOCYTES 2.0 0.8 - 3.5 K/UL    ABS. MONOCYTES 0.5 0.0 - 1.0 K/UL    ABS. EOSINOPHILS 0.3 0.0 - 0.4 K/UL    ABS. BASOPHILS 0.1 0.0 - 0.1 K/UL    ABS. IMM.  GRANS. 0.0 0.00 - 0.04 K/UL    DF AUTOMATED     METABOLIC PANEL, COMPREHENSIVE    Collection Time: 02/08/23 12:26 PM   Result Value Ref Range    Sodium 139 136 - 145 mmol/L    Potassium 4.2 3.5 - 5.1 mmol/L    Chloride 103 97 - 108 mmol/L    CO2 26 21 - 32 mmol/L    Anion gap 10 5 - 15 mmol/L    Glucose 95 65 - 100 mg/dL    BUN 13 6 - 20 MG/DL    Creatinine 0.85 0.55 - 1.02 MG/DL    BUN/Creatinine ratio 15 12 - 20      eGFR >60 >60 ml/min/1.73m2    Calcium 9.5 8.5 - 10.1 MG/DL Bilirubin, total 0.8 0.2 - 1.0 MG/DL    ALT (SGPT) 20 12 - 78 U/L    AST (SGOT) 25 15 - 37 U/L    Alk. phosphatase 64 45 - 117 U/L    Protein, total 8.0 6.4 - 8.2 g/dL    Albumin 4.0 3.5 - 5.0 g/dL    Globulin 4.0 2.0 - 4.0 g/dL    A-G Ratio 1.0 (L) 1.1 - 2.2     PROTHROMBIN TIME + INR    Collection Time: 02/08/23 12:26 PM   Result Value Ref Range    INR 1.0 0.9 - 1.1      Prothrombin time 10.2 9.0 - 11.1 sec        IMAGING:  CTA CODE NEURO HEAD AND NECK W CONT   Final Result   CTA Head:   1. No evidence of significant stenosis or aneurysm. CTA Neck:   1. No evidence of significant stenosis. CT CODE NEURO HEAD WO CONTRAST   Final Result   1. No acute intracranial abnormality               Medications During Visit:  Medications   iopamidoL (ISOVUE-370) 370 mg iodine /mL (76 %) injection 100 mL ( IntraVENous Canceled Entry 2/8/23 1237)   sodium chloride 0.9 % bolus infusion 1,000 mL (0 mL IntraVENous IV Completed 2/8/23 1408)   aspirin chewable tablet 324 mg (324 mg Oral Given 2/8/23 1247)   clopidogreL (PLAVIX) tablet 300 mg (300 mg Oral Given 2/8/23 1248)   iopamidoL (ISOVUE-370) 370 mg iodine /mL (76 %) injection 100 mL (100 mL IntraVENous Given 2/8/23 1237)         IMPRESSION:  1.  TIA (transient ischemic attack)        DISPOSITION:  Admitted        Tamera Arevalo DO

## 2023-02-09 ENCOUNTER — APPOINTMENT (OUTPATIENT)
Dept: NON INVASIVE DIAGNOSTICS | Age: 78
End: 2023-02-09
Attending: INTERNAL MEDICINE
Payer: MEDICARE

## 2023-02-09 ENCOUNTER — APPOINTMENT (OUTPATIENT)
Dept: GENERAL RADIOLOGY | Age: 78
End: 2023-02-09
Attending: INTERNAL MEDICINE
Payer: MEDICARE

## 2023-02-09 ENCOUNTER — APPOINTMENT (OUTPATIENT)
Dept: MRI IMAGING | Age: 78
End: 2023-02-09
Attending: INTERNAL MEDICINE
Payer: MEDICARE

## 2023-02-09 VITALS
HEART RATE: 62 BPM | RESPIRATION RATE: 13 BRPM | OXYGEN SATURATION: 98 % | TEMPERATURE: 98 F | SYSTOLIC BLOOD PRESSURE: 139 MMHG | WEIGHT: 141.09 LBS | DIASTOLIC BLOOD PRESSURE: 52 MMHG | HEIGHT: 63 IN | BODY MASS INDEX: 25 KG/M2

## 2023-02-09 PROBLEM — I63.9 ACUTE CVA (CEREBROVASCULAR ACCIDENT) (HCC): Status: ACTIVE | Noted: 2023-02-09

## 2023-02-09 LAB
ALBUMIN SERPL-MCNC: 3.6 G/DL (ref 3.5–5)
ALBUMIN/GLOB SERPL: 1 (ref 1.1–2.2)
ALP SERPL-CCNC: 62 U/L (ref 45–117)
ALT SERPL-CCNC: 20 U/L (ref 12–78)
ANION GAP SERPL CALC-SCNC: 7 MMOL/L (ref 5–15)
APPEARANCE UR: ABNORMAL
AST SERPL-CCNC: 14 U/L (ref 15–37)
BACTERIA URNS QL MICRO: NEGATIVE /HPF
BILIRUB SERPL-MCNC: 0.9 MG/DL (ref 0.2–1)
BILIRUB UR QL: NEGATIVE
BUN SERPL-MCNC: 10 MG/DL (ref 6–20)
BUN/CREAT SERPL: 13 (ref 12–20)
CALCIUM SERPL-MCNC: 9.4 MG/DL (ref 8.5–10.1)
CHLORIDE SERPL-SCNC: 109 MMOL/L (ref 97–108)
CHOLEST SERPL-MCNC: 202 MG/DL
CO2 SERPL-SCNC: 24 MMOL/L (ref 21–32)
COLOR UR: ABNORMAL
CREAT SERPL-MCNC: 0.78 MG/DL (ref 0.55–1.02)
CRP SERPL-MCNC: <0.29 MG/DL (ref 0–0.6)
ECHO AO ROOT DIAM: 3.1 CM
ECHO AO ROOT INDEX: 1.86 CM/M2
ECHO AV AREA PEAK VELOCITY: 1.6 CM2
ECHO AV AREA/BSA PEAK VELOCITY: 1 CM2/M2
ECHO AV PEAK GRADIENT: 10 MMHG
ECHO AV PEAK VELOCITY: 1.6 M/S
ECHO AV VELOCITY RATIO: 0.63
ECHO EST RA PRESSURE: 3 MMHG
ECHO LA DIAMETER INDEX: 1.86 CM/M2
ECHO LA DIAMETER: 3.1 CM
ECHO LA TO AORTIC ROOT RATIO: 1
ECHO LA VOL 2C: 23 ML (ref 22–52)
ECHO LA VOL 4C: 35 ML (ref 22–52)
ECHO LA VOLUME AREA LENGTH: 34 ML
ECHO LA VOLUME INDEX A2C: 14 ML/M2 (ref 16–34)
ECHO LA VOLUME INDEX A4C: 21 ML/M2 (ref 16–34)
ECHO LA VOLUME INDEX AREA LENGTH: 20 ML/M2 (ref 16–34)
ECHO LV E' LATERAL VELOCITY: 12 CM/S
ECHO LV E' SEPTAL VELOCITY: 8 CM/S
ECHO LV EDV A2C: 43 ML
ECHO LV EDV A4C: 72 ML
ECHO LV EDV BP: 57 ML (ref 56–104)
ECHO LV EDV INDEX A4C: 43 ML/M2
ECHO LV EDV INDEX BP: 34 ML/M2
ECHO LV EDV NDEX A2C: 26 ML/M2
ECHO LV EJECTION FRACTION A2C: 68 %
ECHO LV EJECTION FRACTION A4C: 64 %
ECHO LV EJECTION FRACTION BIPLANE: 67 % (ref 55–100)
ECHO LV ESV A2C: 14 ML
ECHO LV ESV A4C: 26 ML
ECHO LV ESV BP: 19 ML (ref 19–49)
ECHO LV ESV INDEX A2C: 8 ML/M2
ECHO LV ESV INDEX A4C: 16 ML/M2
ECHO LV ESV INDEX BP: 11 ML/M2
ECHO LV FRACTIONAL SHORTENING: 39 % (ref 28–44)
ECHO LV INTERNAL DIMENSION DIASTOLE INDEX: 2.46 CM/M2
ECHO LV INTERNAL DIMENSION DIASTOLIC: 4.1 CM (ref 3.9–5.3)
ECHO LV INTERNAL DIMENSION SYSTOLIC INDEX: 1.5 CM/M2
ECHO LV INTERNAL DIMENSION SYSTOLIC: 2.5 CM
ECHO LV IVSD: 0.9 CM (ref 0.6–0.9)
ECHO LV MASS 2D: 114.1 G (ref 67–162)
ECHO LV MASS INDEX 2D: 68.3 G/M2 (ref 43–95)
ECHO LV POSTERIOR WALL DIASTOLIC: 0.9 CM (ref 0.6–0.9)
ECHO LV RELATIVE WALL THICKNESS RATIO: 0.44
ECHO LVOT AREA: 2.3 CM2
ECHO LVOT DIAM: 1.7 CM
ECHO LVOT PEAK GRADIENT: 4 MMHG
ECHO LVOT PEAK VELOCITY: 1 M/S
ECHO MV A VELOCITY: 0.71 M/S
ECHO MV AREA PHT: 2.6 CM2
ECHO MV E DECELERATION TIME (DT): 289.5 MS
ECHO MV E VELOCITY: 0.75 M/S
ECHO MV E/A RATIO: 1.06
ECHO MV E/E' LATERAL: 6.25
ECHO MV E/E' RATIO (AVERAGED): 7.81
ECHO MV E/E' SEPTAL: 9.38
ECHO MV PRESSURE HALF TIME (PHT): 84 MS
ECHO MV REGURGITANT PEAK GRADIENT: 77 MMHG
ECHO MV REGURGITANT PEAK VELOCITY: 4.4 M/S
ECHO PV MAX VELOCITY: 0.9 M/S
ECHO PV PEAK GRADIENT: 3 MMHG
ECHO RIGHT VENTRICULAR SYSTOLIC PRESSURE (RVSP): 33 MMHG
ECHO RV TAPSE: 2.2 CM (ref 1.7–?)
ECHO TV REGURGITANT MAX VELOCITY: 2.73 M/S
ECHO TV REGURGITANT PEAK GRADIENT: 30 MMHG
EPITH CASTS URNS QL MICRO: ABNORMAL /LPF
EST. AVERAGE GLUCOSE BLD GHB EST-MCNC: 108 MG/DL
FOLATE SERPL-MCNC: 26.1 NG/ML (ref 5–21)
GLOBULIN SER CALC-MCNC: 3.6 G/DL (ref 2–4)
GLUCOSE SERPL-MCNC: 85 MG/DL (ref 65–100)
GLUCOSE UR STRIP.AUTO-MCNC: NEGATIVE MG/DL
HBA1C MFR BLD: 5.4 % (ref 4–5.6)
HDLC SERPL-MCNC: 73 MG/DL
HDLC SERPL: 2.8 (ref 0–5)
HGB UR QL STRIP: NEGATIVE
HYALINE CASTS URNS QL MICRO: ABNORMAL /LPF (ref 0–5)
KETONES UR QL STRIP.AUTO: NEGATIVE MG/DL
LDLC SERPL CALC-MCNC: 108.6 MG/DL (ref 0–100)
LEUKOCYTE ESTERASE UR QL STRIP.AUTO: ABNORMAL
MAGNESIUM SERPL-MCNC: 2.4 MG/DL (ref 1.6–2.4)
NITRITE UR QL STRIP.AUTO: NEGATIVE
PH UR STRIP: 6.5 (ref 5–8)
PHOSPHATE SERPL-MCNC: 3.5 MG/DL (ref 2.6–4.7)
POTASSIUM SERPL-SCNC: 3.6 MMOL/L (ref 3.5–5.1)
PROT SERPL-MCNC: 7.2 G/DL (ref 6.4–8.2)
PROT UR STRIP-MCNC: NEGATIVE MG/DL
RBC #/AREA URNS HPF: ABNORMAL /HPF (ref 0–5)
SODIUM SERPL-SCNC: 140 MMOL/L (ref 136–145)
SP GR UR REFRACTOMETRY: 1.01 (ref 1–1.03)
TRIGL SERPL-MCNC: 102 MG/DL (ref ?–150)
TROPONIN I SERPL HS-MCNC: 8 NG/L (ref 0–51)
TSH SERPL DL<=0.05 MIU/L-ACNC: 3.57 UIU/ML (ref 0.36–3.74)
UROBILINOGEN UR QL STRIP.AUTO: 1 EU/DL (ref 0.2–1)
VIT B12 SERPL-MCNC: 340 PG/ML (ref 193–986)
VLDLC SERPL CALC-MCNC: 20.4 MG/DL
WBC URNS QL MICRO: ABNORMAL /HPF (ref 0–4)

## 2023-02-09 PROCEDURE — 83735 ASSAY OF MAGNESIUM: CPT

## 2023-02-09 PROCEDURE — 80061 LIPID PANEL: CPT

## 2023-02-09 PROCEDURE — 92610 EVALUATE SWALLOWING FUNCTION: CPT

## 2023-02-09 PROCEDURE — 74011250636 HC RX REV CODE- 250/636: Performed by: INTERNAL MEDICINE

## 2023-02-09 PROCEDURE — 97116 GAIT TRAINING THERAPY: CPT

## 2023-02-09 PROCEDURE — 86140 C-REACTIVE PROTEIN: CPT

## 2023-02-09 PROCEDURE — 97165 OT EVAL LOW COMPLEX 30 MIN: CPT

## 2023-02-09 PROCEDURE — 81001 URINALYSIS AUTO W/SCOPE: CPT

## 2023-02-09 PROCEDURE — 70551 MRI BRAIN STEM W/O DYE: CPT

## 2023-02-09 PROCEDURE — 99223 1ST HOSP IP/OBS HIGH 75: CPT | Performed by: PSYCHIATRY & NEUROLOGY

## 2023-02-09 PROCEDURE — 83036 HEMOGLOBIN GLYCOSYLATED A1C: CPT

## 2023-02-09 PROCEDURE — 82746 ASSAY OF FOLIC ACID SERUM: CPT

## 2023-02-09 PROCEDURE — 74011250637 HC RX REV CODE- 250/637: Performed by: INTERNAL MEDICINE

## 2023-02-09 PROCEDURE — 97530 THERAPEUTIC ACTIVITIES: CPT

## 2023-02-09 PROCEDURE — 82607 VITAMIN B-12: CPT

## 2023-02-09 PROCEDURE — 96374 THER/PROPH/DIAG INJ IV PUSH: CPT

## 2023-02-09 PROCEDURE — 84443 ASSAY THYROID STIM HORMONE: CPT

## 2023-02-09 PROCEDURE — 80053 COMPREHEN METABOLIC PANEL: CPT

## 2023-02-09 PROCEDURE — 36415 COLL VENOUS BLD VENIPUNCTURE: CPT

## 2023-02-09 PROCEDURE — 84100 ASSAY OF PHOSPHORUS: CPT

## 2023-02-09 PROCEDURE — G0378 HOSPITAL OBSERVATION PER HR: HCPCS

## 2023-02-09 PROCEDURE — 71045 X-RAY EXAM CHEST 1 VIEW: CPT

## 2023-02-09 PROCEDURE — 97161 PT EVAL LOW COMPLEX 20 MIN: CPT

## 2023-02-09 PROCEDURE — 97535 SELF CARE MNGMENT TRAINING: CPT

## 2023-02-09 PROCEDURE — 84484 ASSAY OF TROPONIN QUANT: CPT

## 2023-02-09 RX ORDER — ATORVASTATIN CALCIUM 40 MG/1
40 TABLET, FILM COATED ORAL
Status: DISCONTINUED | OUTPATIENT
Start: 2023-02-09 | End: 2023-02-09 | Stop reason: HOSPADM

## 2023-02-09 RX ORDER — LEVOTHYROXINE SODIUM 50 UG/1
50 TABLET ORAL
Status: DISCONTINUED | OUTPATIENT
Start: 2023-02-09 | End: 2023-02-09 | Stop reason: HOSPADM

## 2023-02-09 RX ORDER — SODIUM CHLORIDE, SODIUM LACTATE, POTASSIUM CHLORIDE, CALCIUM CHLORIDE 600; 310; 30; 20 MG/100ML; MG/100ML; MG/100ML; MG/100ML
75 INJECTION, SOLUTION INTRAVENOUS CONTINUOUS
Status: DISCONTINUED | OUTPATIENT
Start: 2023-02-09 | End: 2023-02-09 | Stop reason: HOSPADM

## 2023-02-09 RX ORDER — ONDANSETRON 2 MG/ML
4 INJECTION INTRAMUSCULAR; INTRAVENOUS
Status: DISCONTINUED | OUTPATIENT
Start: 2023-02-09 | End: 2023-02-09 | Stop reason: HOSPADM

## 2023-02-09 RX ORDER — GUAIFENESIN 100 MG/5ML
81 LIQUID (ML) ORAL DAILY
Status: DISCONTINUED | OUTPATIENT
Start: 2023-02-09 | End: 2023-02-09 | Stop reason: HOSPADM

## 2023-02-09 RX ORDER — ACETAMINOPHEN 325 MG/1
650 TABLET ORAL
Status: DISCONTINUED | OUTPATIENT
Start: 2023-02-09 | End: 2023-02-09 | Stop reason: HOSPADM

## 2023-02-09 RX ORDER — BISACODYL 5 MG
5 TABLET, DELAYED RELEASE (ENTERIC COATED) ORAL DAILY PRN
Status: DISCONTINUED | OUTPATIENT
Start: 2023-02-09 | End: 2023-02-09 | Stop reason: HOSPADM

## 2023-02-09 RX ORDER — ATORVASTATIN CALCIUM 40 MG/1
40 TABLET, FILM COATED ORAL
Qty: 30 TABLET | Refills: 5 | Status: SHIPPED | OUTPATIENT
Start: 2023-02-09

## 2023-02-09 RX ORDER — ENOXAPARIN SODIUM 100 MG/ML
40 INJECTION SUBCUTANEOUS EVERY 24 HOURS
Status: DISCONTINUED | OUTPATIENT
Start: 2023-02-09 | End: 2023-02-09 | Stop reason: HOSPADM

## 2023-02-09 RX ORDER — GUAIFENESIN 100 MG/5ML
81 LIQUID (ML) ORAL DAILY
Qty: 30 TABLET | Refills: 11 | Status: SHIPPED | OUTPATIENT
Start: 2023-02-10

## 2023-02-09 RX ORDER — ACETAMINOPHEN 650 MG/1
650 SUPPOSITORY RECTAL
Status: DISCONTINUED | OUTPATIENT
Start: 2023-02-09 | End: 2023-02-09 | Stop reason: HOSPADM

## 2023-02-09 RX ADMIN — ASPIRIN 81 MG: 81 TABLET, CHEWABLE ORAL at 10:33

## 2023-02-09 RX ADMIN — LEVOTHYROXINE SODIUM 50 MCG: 0.05 TABLET ORAL at 08:07

## 2023-02-09 RX ADMIN — SODIUM CHLORIDE, POTASSIUM CHLORIDE, SODIUM LACTATE AND CALCIUM CHLORIDE 75 ML/HR: 600; 310; 30; 20 INJECTION, SOLUTION INTRAVENOUS at 08:07

## 2023-02-09 NOTE — PROGRESS NOTES
We will arrange for a 2 week holter monitor sent to pt home. We will arrange for an office appt to follow up after this is completed to discuss results and further recommendations.

## 2023-02-09 NOTE — PROGRESS NOTES
SPEECH PATHOLOGY BEDSIDE SWALLOW EVALUATION/DISCHARGE  Patient: Martin Paul (53 y.o. female)  Date: 2/9/2023  Primary Diagnosis: TIA (transient ischemic attack) [G45.9]       Precautions: Fall    ASSESSMENT :  Brain MRI showing \" Chronic white matter disease with no acute infarction\". Based on the objective data described below, the patient presents with suspect functional oropharyngeal swallow with GI component. Patient with GERD and occasionally will take Zantac but it is not managed by a MD and she does not take meds on a daily basis. She confirms globus sensation with PO. Patient's daughter, Camille Parmar reports that her mother constantly clears her throat even in the absence of PO. She occasionally coughs with meals. She has no history of PNA/respiratory changes. Patient with timely and complete mastication of solid. Patient with inconsistent strong cough following thins, more so with straw use. Discussed how given negative MRI, no changes with swallow function, no concerns for respiratory changes, high suspicion for Gi component, no further workup indicated while hospitalized. We discussed consideration of GI follow up to better manage GERD, possible esophagram. Also discussed MBS if any changes in respiratory status or increasing concern. Daughter in agreement with not pursing any testing at this time. We discussed reflux precautions. Additionally, patient has had ~ a year of some language deficits along with memory. Daughter reporting no formal Dementia diagnosis yet but it was recommended she go to the OP clinic at [facility] to further workup. Patient lives alone and daughter reports she is forgetful on simple things like how to work a new . Daughter interested in Luis Waters. Discussed possibility of Luis Waters coming in to address functional tasks and help to implement memory aids in her home environment. Skilled acute therapy provided by a speech-language pathologist is not indicated at this time. PLAN :  Recommendations:  -- Regular diet/ thin liquids. -- Strict Gi precautions.  Recommending GI workup as outpatient   -- meds as tolerated  - alternate liquid/solids     Discharge Recommendations: Home Health for trial/ implementation of memory aids around her home, address functional tasks such as med management, etc       SUBJECTIVE:   Patient stated Nathan Marta will we start working on our tasks informed that a different set of therapists provide home health therapy    OBJECTIVE:     Past Medical History:   Diagnosis Date    GERD (gastroesophageal reflux disease)     Hypothyroidism     Intraductal papilloma 2012    Migraine     RA (rheumatoid arthritis) ([facility] [address]     remission     Past Surgical History:   Procedure Laterality Date    HX BREAST BIOPSY Right     benign surgical- papilloma    HX BREAST BIOPSY Right     ultrasound core benign-papilloma    HX  SECTION  1983    HX DILATION AND CURETTAGE      HX OTHER SURGICAL      DONNA RECTAL ABCESS    HX OTHER SURGICAL      last colonoscopy -      Prior Level of Function/Home Situation:   Home Situation  Home Environment: Private residence  # Steps to Enter: 5  Rails to Enter: Yes  Hand Rails : Bilateral (wide)  One/Two Story Residence: Two story, live on 1st floor  # of Interior Steps: 15  Interior Rails: Both (for half and then walls on either side for other half)  Living Alone: Yes  Support Systems: Child(paz)  Patient Expects to be Discharged to[de-identified] Home with home health  Current DME Used/Available at Home: [address] [employer] chair ([address])  Tub or Shower Type: Shower  Diet prior to admission: regular/thin  Current Diet:  regular/thin   Cognitive and Communication Status:  Neurologic State: Alert  Orientation Level: Oriented X4  Cognition: Follows commands, Appropriate decision making  Perception: Appears intact  Perseveration: No perseveration noted  Safety/Judgement: Fall prevention, Home safety, Awareness of environment  Oral Assessment:     P.O. Trials:  Patient Position: up in bed  Vocal quality prior to P.O.: No impairment  Consistency Presented: Thin liquid; Solid  How Presented: Successive swallows;Straw;Cup/sip; Self-fed/presented     Bolus Acceptance: No impairment  Bolus Formation/Control: No impairment     Propulsion: No impairment  Oral Residue: None  Initiation of Swallow: No impairment  Laryngeal Elevation: Functional  Aspiration Signs/Symptoms: Clear throat;Delayed cough/throat clear (throughout session, seemed GI in nature)                Oral Phase Severity: No impairment     NOMS:   The NOMS functional outcome measure was used to quantify this patient's level of swallowing impairment. Based on the NOMS, the patient was determined to be at level 7 for swallow function     NOMS Swallowing Levels:  Level 1 (CN): NPO  Level 2 (CM): NPO but takes consistency in therapy  Level 3 (CL): Takes less than 50% of nutrition p.o. and continues with nonoral feedings; and/or safe with mod cues; and/or max diet restriction  Level 4 (CK): Safe swallow but needs mod cues; and/or mod diet restriction; and/or still requires some nonoral feeding/supplements  Level 5 (CJ): Safe swallow with min diet restriction; and/or needs min cues  Level 6 (CI): Independent with p.o.; rare cues; usually self cues; may need to avoid some foods or needs extra time  Level 7 ([address] [place] [address]): Independent for all p.o.  CRISTINA. (2003). National Outcomes Measurement System (NOMS): Adult Speech-Language Pathology User's Guide. Pain:  Pain Scale 1: Numeric (0 - 10)  Pain Intensity 1: 0     After treatment:   Patient left in no apparent distress sitting up in chair, Call bell within reach, Nursing notified, and Caregiver / family present    COMMUNICATION/EDUCATION:     The patient's plan of care including recommendations, planned interventions, and recommended diet changes were discussed with: Registered nurse.      Thank you for this referral.  Tomy Cook, SLP  Time Calculation: 24 mins

## 2023-02-09 NOTE — PROGRESS NOTES
Speech pathology  Orders received, chart reviewed, attempted to see but echocardiogram being started at bedside. Spoke with daughter in the doorway who expressed concern for patient's language and memory deficits. She reports she has asked for speech therapy in the past but they were not provided. She has noted these deficits over the last year. Inquired if her mother had a diagnosis of Dementia which she denied and states she has a diagnosis of a \" mild cognitive impairment\". Daughter is not aware of MRI results at this time. Daughter also interested to see how her mother is swallowing since admission.     Will follow up later this morning  Denmillicent Negro M.S. CCC-SLP

## 2023-02-09 NOTE — CONSULTS
Neurology Consult Note     NAME: Carloz Valladares   :  1945   MRN:  [id number]   DATE:  2023       HPI:  Pt is a 78yo RH female admitted 23 with right sided weakness and numbness, LKN 1115, NIHSS score 2 with mild aphasia and right PD. CTH neg. Per ED, sxs had resolved by the time she returned from CT. Friend with her reported, word finding over the last year, also mentioned in ED note 23. CTA H/N unremarkable. CTP neg. MRI brain wo contrast without acute stroke, does have ventricular enlargement and confluent white matter hyperintensity adjacent to frontal horns and periventricular region which could represent transependymal CSF flow. .6, HgbA1C 5.4. TTE completed, results pending. EKG with sinus hector. Pt is seen with her daughter Vladimir Martell, a speech pathologist, at the bedside who aids in the history. Pt reports she was riding in a car and all of a sudden her right face was tingling, then she went into a store and right arm felt heavy. Daughter adds that she was with her care aid and they reported that the whole right side was weak, had to get a WC b/c she could not walk. No HTN, no HLD, no DM, distant h/o smoking quit in her 30's, no known SHAMA but does snore and has woken herself. Not on APT/OAC at home. Pt was initially evaluated by Dr. Kelsy Merino at [facility] for memory loss in , referred to Dr. Jordan Borrego who diagnosed MCI in 2022. They have noticed shuffling since  with falls due to this. Pt reports some bladder incontinence, her answer is not clear. Her daughter has noticed significant progression of word finding over the last year. She is on Aricept 10mg daily. She is no longer driving, but is living alone, has aids coming in.        PMH:  MHA  RA  Hypothyroidism  GERD  S/p benign breast bx  S/p Csec  S/p D&C  S/p perirectal abscess resection    ROS:  Per HPI o/w neg, but limited due to pt factors    MEDS:  Home:  Prior to Admission Medications   Prescriptions Last Dose Informant Patient Reported? Taking? VITAMIN B COMPLEX PO   Yes No   Sig: Take  by mouth. Takes one po occasionally. Super B complex. levothyroxine (SYNTHROID) 50 mcg tablet   Yes No   Sig: Take 50 mcg by mouth Daily (before breakfast). ranitidine (ZANTAC) 150 mg tablet   Yes No   Sig: Take 150 mg by mouth two (2) times a day. Facility-Administered Medications: None     Current Facility-Administered Medications:     levothyroxine (SYNTHROID) tablet 50 mcg, 50 mcg, Oral, ACB, Vivi Fuentes MD, 50 mcg at 23 0807    acetaminophen (TYLENOL) tablet 650 mg, 650 mg, Oral, Q4H PRN **OR** acetaminophen (TYLENOL) solution 650 mg, 650 mg, Per NG tube, Q4H PRN **OR** acetaminophen (TYLENOL) suppository 650 mg, 650 mg, Rectal, Q4H PRN, Vivi Fuentes MD    ondansetron (ZOFRAN) injection 4 mg, 4 mg, IntraVENous, Q6H PRN, Vivi Feuntes MD    aspirin chewable tablet 81 mg, 81 mg, Oral, DAILY, Vivi Fuentes MD, 81 mg at 23 1033    atorvastatin (LIPITOR) tablet 40 mg, 40 mg, Oral, QHS, Vivi Fuentes MD    bisacodyL (DULCOLAX) tablet 5 mg, 5 mg, Oral, DAILY PRN, Vivi Fuentes MD    enoxaparin (LOVENOX) injection 40 mg, 40 mg, SubCUTAneous, Q24H, Vivi Fuentes MD    lactated Ringers infusion, 75 mL/hr, IntraVENous, CONTINUOUS, Vivi Fuentes MD, Last Rate: 75 mL/hr at 23 0807, 75 mL/hr at 23 0807      No Known Allergies      SH:     Quit smoking 33yo  Occasional Etoh  No drug use    FH:  M- Thyroid, Alzheimers  F - Prostate CA  Son - DM      PHYSICAL EXAM:    Visit Vitals  BP (!) 139/52   Pulse (!) 50   Temp 97.9 °F (36.6 °C)   Resp 12   Ht 5' 3\" (1.6 m)   Wt 141 lb 1.5 oz (64 kg)   SpO2 98%   BMI 24.99 kg/m²     Temp (24hrs), Av.9 °F (36.6 °C), Min:97.8 °F (36.6 °C), Max:98.1 °F (36.7 °C)    General: Well developed well nourished patient in no apparent distress. Cardiac: Regular rhythm with no murmurs, bradycardic. Carotids: 2+ symmetric, no bruits  Extremities: 2+ Radial pulses, no cyanosis or edema    Neurological Exam:  Mental Status: Oriented to person, daughter, situation. Speech - no dysarthria. Language -severe word finding, able to follow all commands. Attention and fund of knowledge appropriate. Cranial Nerves:   VFF, PERRL, EOMI, no nystagmus, no diplopia, no ptosis. Facial sensation is normal. Facial movement is symmetric. Palate is midline. Tongue is midline. Hearing is intact   Motor:  5/5 strength in upper and lower proximal and distal muscles. Normal bulk and tone. No PD. No tremors   Reflexes:   Deep tendon reflexes 2+ and symmetric. Toes downgoing.    Sensory:   Intact to LT and PP   Gait:  Shuffling gait   Cerebellar:  Intact FTN, MISTI          STUDIES AND REPORTS:  Recent Results (from the past 24 hour(s))   GLUCOSE, POC    Collection Time: 02/08/23 11:57 AM   Result Value Ref Range    Glucose (POC) 99 65 - 117 mg/dL    Performed by Alex Tipton RN    EKG, 12 LEAD, INITIAL    Collection Time: 02/08/23 12:24 PM   Result Value Ref Range    Ventricular Rate 49 BPM    Atrial Rate 49 BPM    P-R Interval 134 ms    QRS Duration 70 ms    Q-T Interval 446 ms    QTC Calculation (Bezet) 402 ms    Calculated P Axis 76 degrees    Calculated R Axis 22 degrees    Calculated T Axis 64 degrees    Diagnosis       Sinus bradycardia  Otherwise normal ECG  When compared with ECG of 07-DEC-2012 13:13,  No significant change was found  Confirmed by Marissa Armstrong MD ([id number]) on 2/8/2023 4:14:44 PM     CBC WITH AUTOMATED DIFF    Collection Time: 02/08/23 12:26 PM   Result Value Ref Range    WBC 6.8 3.6 - 11.0 K/uL    RBC 4.25 3.80 - 5.20 M/uL    HGB 12.8 11.5 - 16.0 g/dL    HCT 39.1 35.0 - 47.0 %    MCV 92.0 80.0 - 99.0 FL    MCH 30.1 26.0 - 34.0 PG    MCHC 32.7 30.0 - 36.5 g/dL    RDW 13.2 11.5 - 14.5 %    PLATELET 825 150 - 400 K/uL    MPV 9.7 8.9 - 12.9 FL    NRBC 0.0 0  WBC    ABSOLUTE NRBC 0.00 0.00 - 0.01 K/uL    NEUTROPHILS 57 32 - 75 %    LYMPHOCYTES 30 12 - 49 %    MONOCYTES 8 5 - 13 %    EOSINOPHILS 4 0 - 7 %    BASOPHILS 1 0 - 1 %    IMMATURE GRANULOCYTES 0 0.0 - 0.5 %    ABS. NEUTROPHILS 3.9 1.8 - 8.0 K/UL    ABS. LYMPHOCYTES 2.0 0.8 - 3.5 K/UL    ABS. MONOCYTES 0.5 0.0 - 1.0 K/UL    ABS. EOSINOPHILS 0.3 0.0 - 0.4 K/UL    ABS. BASOPHILS 0.1 0.0 - 0.1 K/UL    ABS. IMM. GRANS. 0.0 0.00 - 0.04 K/UL    DF AUTOMATED     METABOLIC PANEL, COMPREHENSIVE    Collection Time: 02/08/23 12:26 PM   Result Value Ref Range    Sodium 139 136 - 145 mmol/L    Potassium 4.2 3.5 - 5.1 mmol/L    Chloride 103 97 - 108 mmol/L    CO2 26 21 - 32 mmol/L    Anion gap 10 5 - 15 mmol/L    Glucose 95 65 - 100 mg/dL    BUN 13 6 - 20 MG/DL    Creatinine 0.85 0.55 - 1.02 MG/DL    BUN/Creatinine ratio 15 12 - 20      eGFR >60 >60 ml/min/1.73m2    Calcium 9.5 8.5 - 10.1 MG/DL    Bilirubin, total 0.8 0.2 - 1.0 MG/DL    ALT (SGPT) 20 12 - 78 U/L    AST (SGOT) 25 15 - 37 U/L    Alk.  phosphatase 64 45 - 117 U/L    Protein, total 8.0 6.4 - 8.2 g/dL    Albumin 4.0 3.5 - 5.0 g/dL    Globulin 4.0 2.0 - 4.0 g/dL    A-G Ratio 1.0 (L) 1.1 - 2.2     PROTHROMBIN TIME + INR    Collection Time: 02/08/23 12:26 PM   Result Value Ref Range    INR 1.0 0.9 - 1.1      Prothrombin time 10.2 9.0 - 11.1 sec   LIPID PANEL    Collection Time: 02/09/23  4:18 AM   Result Value Ref Range    Cholesterol, total 202 (H) <200 MG/DL    Triglyceride 102 <150 MG/DL    HDL Cholesterol 73 MG/DL    LDL, calculated 108.6 (H) 0 - 100 MG/DL    VLDL, calculated 20.4 MG/DL    CHOL/HDL Ratio 2.8 0.0 - 5.0     HEMOGLOBIN A1C WITH EAG    Collection Time: 02/09/23  4:18 AM   Result Value Ref Range    Hemoglobin A1c 5.4 4.0 - 5.6 %    Est. average glucose 108 mg/dL   TSH 3RD GENERATION    Collection Time: 02/09/23  4:18 AM   Result Value Ref Range    TSH 3.57 0.36 - 3.74 uIU/mL   MAGNESIUM Collection Time: 02/09/23  4:18 AM   Result Value Ref Range    Magnesium 2.4 1.6 - 2.4 mg/dL   PHOSPHORUS    Collection Time: 02/09/23  4:18 AM   Result Value Ref Range    Phosphorus 3.5 2.6 - 4.7 MG/DL   METABOLIC PANEL, COMPREHENSIVE    Collection Time: 02/09/23  4:18 AM   Result Value Ref Range    Sodium 140 136 - 145 mmol/L    Potassium 3.6 3.5 - 5.1 mmol/L    Chloride 109 (H) 97 - 108 mmol/L    CO2 24 21 - 32 mmol/L    Anion gap 7 5 - 15 mmol/L    Glucose 85 65 - 100 mg/dL    BUN 10 6 - 20 MG/DL    Creatinine 0.78 0.55 - 1.02 MG/DL    BUN/Creatinine ratio 13 12 - 20      eGFR >60 >60 ml/min/1.73m2    Calcium 9.4 8.5 - 10.1 MG/DL    Bilirubin, total 0.9 0.2 - 1.0 MG/DL    ALT (SGPT) 20 12 - 78 U/L    AST (SGOT) 14 (L) 15 - 37 U/L    Alk. phosphatase 62 45 - 117 U/L    Protein, total 7.2 6.4 - 8.2 g/dL    Albumin 3.6 3.5 - 5.0 g/dL    Globulin 3.6 2.0 - 4.0 g/dL    A-G Ratio 1.0 (L) 1.1 - 2.2     FOLATE    Collection Time: 02/09/23  4:18 AM   Result Value Ref Range    Folate 26.1 (H) 5.0 - 21.0 ng/mL   VITAMIN B12    Collection Time: 02/09/23  4:18 AM   Result Value Ref Range    Vitamin B12 340 193 - 986 pg/mL   C REACTIVE PROTEIN, QT    Collection Time: 02/09/23  4:18 AM   Result Value Ref Range    C-Reactive protein <0.29 0.00 - 0.60 mg/dL   TROPONIN-HIGH SENSITIVITY    Collection Time: 02/09/23  4:18 AM   Result Value Ref Range    Troponin-High Sensitivity 8 0 - 51 ng/L     MRI Results (most recent):  Results from [place] encounter on 02/08/23    MRI BRAIN WO CONT    Narrative  INDICATION:   Acute CVA    EXAMINATION:  MRI BRAIN WO CONTRAST    COMPARISON:  2/8/2023    TECHNIQUE:  Multiplanar multisequence acquisition without contrast of the brain. FINDINGS:    Ventricles: Moderate ventriculomegaly likely related to generalized parenchymal  volume loss. Brain Parenchyma/Brainstem:  Mild to moderate patchy supratentorial T2  hyperintense signal. No acute infarction.   Intracranial Hemorrhage: None.  Basal Cisterns:  Normal.  Flow Voids:  Normal.  Additional Comments:  N/A. Impression  Chronic white matter disease with no acute infarction. CT Results (most recent):  Results from Hospital Encounter encounter on 02/08/23    CTA CODE NEURO HEAD AND NECK W CONT    Narrative  EXAM:  CTA CODE NEURO HEAD AND NECK W CONT    INDICATION:   Code Stroke    COMPARISON:  CT head 2/8/2023. CONTRAST:  100 mL of Isovue-370. TECHNIQUE:  Unenhanced  images were obtained to localize the volume for  acquisition. Multislice helical axial CT angiography was performed from the  aortic arch to the top of the head during uneventful rapid bolus intravenous  contrast administration. Coronal and sagittal reformations and 3D post  processing was performed. CT dose reduction was achieved through use of a  standardized protocol tailored for this examination and automatic exposure  control for dose modulation. This study was analyzed by the [address]  algorithm. FINDINGS:    CTA Head:  There is no evidence of large vessel occlusion or flow-limiting stenosis of the  intracranial internal carotid, anterior cerebral, and middle cerebral arteries. The anterior communicating artery is patent. There is no evidence of large vessel occlusion or flow-limiting stenosis of the  intracranial vertebral arteries, basilar artery, or posterior cerebral arteries. The right vertebral artery terminates in PICA. The left posterior communicating  artery is patent, the right is not seen. There is no evidence of aneurysm or vascular malformation. The dural venous  sinuses and deep cerebral venous system are patent. No evidence of abnormal  enhancement on delayed phase images. CTA NECK:  NASCET method was utilized for calculating stenosis. The aortic arch is unremarkable. The common carotid arteries demonstrate no  significant stenosis.  There is no evidence of significant stenosis in the  cervical right internal carotid artery. There is no evidence of significant  stenosis in the cervical left internal carotid artery. There is a left dominant vertebrobasilar arterial system. The cervical vertebral  arteries are normal in course, size and contour without significant stenosis. Visualized soft tissues of the neck are unremarkable. Mild biapical pleural  parenchymal scarring. No acute fracture or aggressive osseous lesion. Multilevel  degenerative disc disease in the cervical spine most advanced at C5-C6 and  C6-C7. Impression  CTA Head:  1. No evidence of significant stenosis or aneurysm. CTA Neck:  1. No evidence of significant stenosis. Assessment and Plan:   Pt is a 78yo RH female presenting 2/8/23 with right sided weakness and numbness, improving by the time she arrived in ED and resolved after initial [address]. No known stroke risk factors. Pt has baseline aphasia>memory loss, shuffling, and possibly bladder incontinence. CTH neg. CTA H/N unremarkable. CTP neg. MRI brain wo contrast without acute stroke, does have ventricular enlargement and confluent white matter hyperintensity adjacent to frontal horns and periventricular region which could represent transependymal CSF flow. .6, HgbA1C 5.4. Exam with severe word finding, able to follow all commands, o/w non-focal. History c/w TIA. No etiology found. LDL is only minimally elevated. TTE pending. If TTE unremarkable, recommend outpt cardiac monitoring for Afib. Continue ASA 81mg daily and Lipitor 40mg daily, goal LDL<70. Will discussed sleep evaluation for possible SHAMA with PCP. More chronic history and MRI findings are concerning for NPH. Recommend evaluation at [facility] NPH clinic. Continue Aricept 10mg daily. If TTE unremarkable, stable for d/c from neuro standpoint. D/w Dr. Hung Corey. Fu with primary neurologist at discharge. Signed: Juanjo Ordonez MD

## 2023-02-09 NOTE — PROGRESS NOTES
Problem: TIA/CVA Stroke: 0-24 hours  Goal: Off Pathway (Use only if patient is Off Pathway)  Outcome: Progressing Towards Goal  Goal: Activity/Safety  Outcome: Progressing Towards Goal  Goal: Consults, if ordered  Outcome: Progressing Towards Goal  Goal: Diagnostic Test/Procedures  Outcome: Progressing Towards Goal  Goal: Nutrition/Diet  Outcome: Progressing Towards Goal  Goal: Discharge Planning  Outcome: Progressing Towards Goal  Goal: Medications  Outcome: Progressing Towards Goal  Goal: Respiratory  Outcome: Progressing Towards Goal  Goal: Treatments/Interventions/Procedures  Outcome: Progressing Towards Goal  Goal: Minimize risk of bleeding post-thrombolytic infusion  Outcome: Progressing Towards Goal  Goal: Monitor for complications post-thrombolytic infusion  Outcome: Progressing Towards Goal  Goal: Psychosocial  Outcome: Progressing Towards Goal  Goal: *Hemodynamically stable  Outcome: Progressing Towards Goal  Goal: *Neurologically stable  Description: Absence of additional neurological deficits    Outcome: Progressing Towards Goal  Goal: *Verbalizes anxiety and depression are reduced or absent  Outcome: Progressing Towards Goal  Goal: *Absence of Signs of Aspiration on Current Diet  Outcome: Progressing Towards Goal  Goal: *Absence of deep venous thrombosis signs and symptoms(Stroke Metric)  Outcome: Progressing Towards Goal  Goal: *Ability to perform ADLs and demonstrates progressive mobility and function  Outcome: Progressing Towards Goal  Goal: *Stroke education started(Stroke Metric)  Outcome: Progressing Towards Goal  Goal: *Dysphagia screen performed(Stroke Metric)  Outcome: Progressing Towards Goal  Goal: *Rehab consulted(Stroke Metric)  Outcome: Progressing Towards Goal     Problem: Falls - Risk of  Goal: *Absence of Falls  Description: Document Odell Fall Risk and appropriate interventions in the flowsheet.   Outcome: Progressing Towards Goal  Note: Fall Risk Interventions: Problem: Pressure Injury - Risk of  Goal: *Prevention of pressure injury  Description: Document John Scale and appropriate interventions in the flowsheet.   Outcome: Progressing Towards Goal  Note: Pressure Injury Interventions:

## 2023-02-09 NOTE — PROGRESS NOTES
Problem: Self Care Deficits Care Plan (Adult)  Goal: *Acute Goals and Plan of Care (Insert Text)  Description: FUNCTIONAL STATUS PRIOR TO ADMISSION: Patient was independent and active without use of DME. Patient required minimal assistance for basic and instrumental ADLs. Get assist for IADLs such as cooking only. Daughter lives locally and is supportive, she is an SLP but not working right now per her report. HOME SUPPORT: The patient lived alone with daughter to provide assistance. Occupational Therapy Goals  Initiated 2/9/2023  1. Patient will perform grooming standing at sink with independence within 7 day(s). 2.  Patient will perform lower body dressing with independence within 7 day(s). 3.  Patient will perform bathing with independence within 7 day(s). 4.  Patient will perform toilet transfers with independence within 7 day(s). 5.  Patient will perform all aspects of toileting with independence within 7 day(s). 6.  Patient will utilize energy conservation techniques during functional activities with verbal and visual cues within 7 day(s). Outcome: Not Met   OCCUPATIONAL THERAPY EVALUATION  Patient: Richie Luan (52 y.o. female)  Date: 2/9/2023  Primary Diagnosis: TIA (transient ischemic attack) [G45.9]       Precautions: Fall    ASSESSMENT  Based on the objective data described below, the patient presents with slightly impaired sharon upper extremity coordination, fair standing balance with increased support during dynamic tasks, good range of motion and strength, resolved right sided deficits, aphasia for past ~1 year, good lower extremity access, fair activity tolerance, and requiring increased assist for self care and functional mobility/transfers. Patient initially presented with right sided weakness. Patient underwent stroke work up and CT and MRI were both negative for acute infarct at this time.  Patient received semi supine in bed and agreeable to working with therapy, daughter present throughout session and supportive. Per daughter, patient has had speech deficits and worsening balance (2 falls in last three months) for about one year, was getting outpatient PT services and was recommended for OT but had not started. Daughter is an SLP and interested in getting speech therapist services for patient as well, CM notified at end of session. Patient able to transfer to edge of bed and into standing with overall SBA, required occasional CGA for dynamic mobility to and from bathroom, transferred on and off toilet using grab bar and stood at sink for several minutes to brush teeth. Patient returned to recliner in room with daughter present and chair alarm on at end of session. Overall patient presents close to functional baseline with slightly worsened balance, has a supportive daughter who lives close by. Discussed discharge recommendation and both are agreeable to [facility] services at this time. Patient would benefit from skilled OT services during admission to improve independence with self care and functional mobility/transfers. Recommend discharge to home with [address] [place] [address] and increased family support at this time. Current Level of Function Impacting Discharge (ADLs/self-care): SBA to CGA for mobility/transfers, up to CGA for self care tasks    Functional Outcome Measure: The patient scored Total: 75/100 on the Barthel Index outcome measure which is indicative of being minimally dependent in basic self-care. Other factors to consider for discharge: prior level of function independent, lives alone, cognitive deficits, supportive daughter     Patient will benefit from skilled therapy intervention to address the above noted impairments.        PLAN :  Recommendations and Planned Interventions: self care training, functional mobility training, therapeutic exercise, balance training, therapeutic activities, endurance activities, patient education, home safety training, and family training/education    Frequency/Duration: Patient will be followed by occupational therapy 3 times a week to address goals. Recommendation for discharge: (in order for the patient to meet his/her long term goals)  Occupational therapy at least 2 days/week in the home AND ensure assist and/or supervision for safety with IADLs and self care as needed    This discharge recommendation:  Has been made in collaboration with the attending provider and/or case management    IF patient discharges home will need the following DME: patient owns DME required for discharge       SUBJECTIVE:   Patient stated It feels a little harder to do.  when performing lower body dressing.     OBJECTIVE DATA SUMMARY:   HISTORY:   Past Medical History:   Diagnosis Date    GERD (gastroesophageal reflux disease)     Hypothyroidism     Intraductal papilloma 2012    Migraine     RA (rheumatoid arthritis) ([facility] [address]     remission     Past Surgical History:   Procedure Laterality Date    HX BREAST BIOPSY Right     benign surgical- papilloma    HX BREAST BIOPSY Right     ultrasound core benign-papilloma    HX  SECTION  1983    HX DILATION AND CURETTAGE  1970S    HX OTHER SURGICAL      DONNA RECTAL ABCESS    HX OTHER SURGICAL      last colonoscopy -        Expanded or extensive additional review of patient history:     Home Situation  Home Environment: Private residence  # Steps to Enter: 5  Rails to Enter: Yes  Hand Rails : Bilateral (wide)  One/Two Story Residence: Two story, live on 1st floor  # of Interior Steps: [address]: Both (for half and then walls on either side for other half)  Living Alone: Yes  Support Systems: Child(paz)  Patient Expects to be Discharged to[de-identified] Home with home health  Current DME Used/Available at Home: [address] chair (reacher)  Tub or Shower Type: Shower    Hand dominance: Right    EXAMINATION OF PERFORMANCE DEFICITS:  Cognitive/Behavioral Status:  Neurologic State: Alert  Orientation Level: Oriented X4  Cognition: Follows commands; Appropriate decision making  Perception: Appears intact  Perseveration: No perseveration noted  Safety/Judgement: Fall prevention;Home safety; Awareness of environment    Skin: intact where visible    Edema: none noted    Hearing: Auditory  Auditory Impairment: None    Vision/Perceptual:    Tracking: Able to track stimulus in all quadrants w/o difficulty                 Diplopia: No         Corrective Lenses: Glasses    Range of Motion:  AROM: Within functional limits  PROM: Within functional limits                      Strength:  Strength: Within functional limits                Coordination:  Coordination: Generally decreased, functional (impairments bilaterally)            Tone & Sensation:  Tone: Normal  Sensation: Intact                      Balance:  Sitting: Intact  Standing: Impaired; Without support  Standing - Static: Good  Standing - Dynamic : Fair;Occasional    Functional Mobility and Transfers for ADLs:  Bed Mobility:  Rolling: Stand-by assistance  Supine to Sit: Stand-by assistance  Sit to Supine:  (pt left sitting in recliner)  Scooting: Stand-by assistance    Transfers:  Sit to Stand: Stand-by assistance  Stand to Sit: Stand-by assistance  Bed to Chair: Contact guard assistance  Bathroom Mobility: Contact guard assistance  Toilet Transfer : Contact guard assistance  Assistive Device : Gait Belt    ADL Assessment:  Feeding: Independent (infer)    Oral Facial Hygiene/Grooming: Stand-by assistance (standing at sink)    Bathing: Stand-by assistance (infer)         Upper Body Dressing: Independent (infer)    Lower Body Dressing: Stand-by assistance    Toileting: Contact guard assistance (infer)                ADL Intervention and task modifications:       Grooming  Position Performed: Standing  Brushing Teeth: Stand-by assistance  Cues: Verbal cues provided                        Lower Body Dressing Assistance  Socks: Stand-by assistance  Leg Crossed Method Used: Yes  Position Performed: Seated edge of bed         Cognitive Retraining  Safety/Judgement: Fall prevention;Home safety; Awareness of environment     Functional Measure:    Barthel Index:  Bathin  Bladder: 10  Bowels: 10  Groomin  Dressin  Feeding: 10  Mobility: 10  Stairs: 5  Toilet Use: 5  Transfer (Bed to Chair and Back): 10  Total: 75/100      The Barthel ADL Index: Guidelines  1. The index should be used as a record of what a patient does, not as a record of what a patient could do. 2. The main aim is to establish degree of independence from any help, physical or verbal, however minor and for whatever reason. 3. The need for supervision renders the patient not independent. 4. A patient's performance should be established using the best available evidence. Asking the patient, friends/relatives and nurses are the usual sources, but direct observation and common sense are also important. However direct testing is not needed. 5. Usually the patient's performance over the preceding 24-48 hours is important, but occasionally longer periods will be relevant. 6. Middle categories imply that the patient supplies over 50 per cent of the effort. 7. Use of aids to be independent is allowed. Score Interpretation (from [address] [address])    Independent   60-79 Minimally independent   40-59 Partially dependent   20-39 Very dependent   <20 Totally dependent     -Taylor Barahona., Barthel, D.W. (1965). Functional evaluation: the Barthel Index. [address] [facility] ([address] (1997). The Barthel activities of daily living index: self-reporting versus actual performance in the old (> or = 75 years). Journal of [address] [place] [address] 45(7), [facility], JNORBERTOF, Padma Castro., Prabhu Mera. (1999). Measuring the change in disability after inpatient rehabilitation; comparison of the responsiveness of the Barthel Index and Functional Lawrence Measure.  Journal of Neurology, Neurosurgery, and Psychiatry, 66(1), 090-434. YOSEPH Rawls, AIYANA Chamorro, & Kristin Albrecht M.A. (2004) Assessment of post-stroke quality of life in cost-effectiveness studies: The usefulness of the Barthel Index and the EuroQoL-5D. Quality of Life Research, 15, 348-32     Occupational Therapy Evaluation Charge Determination   History Examination Decision-Making   LOW Complexity : Brief history review  LOW Complexity : 1-3 performance deficits relating to physical, cognitive , or psychosocial skils that result in activity limitations and / or participation restrictions  LOW Complexity : No comorbidities that affect functional and no verbal or physical assistance needed to complete eval tasks       Based on the above components, the patient evaluation is determined to be of the following complexity level: LOW   Pain Rating:  Denied pain    Activity Tolerance:   Good, tolerates ADLs without rest breaks, and SpO2 stable on RA    After treatment patient left in no apparent distress:    Sitting in chair, Call bell within reach, Bed / chair alarm activated, and Caregiver / family present    COMMUNICATION/EDUCATION:   The patients plan of care was discussed with: Physical therapist and Registered nurse. Home safety education was provided and the patient/caregiver indicated understanding., Patient/family have participated as able in goal setting and plan of care. , and Patient/family agree to work toward stated goals and plan of care. This patients plan of care is appropriate for delegation to [facility].     Thank you for this referral.  Flores West OTR/L  Time Calculation: 30 mins

## 2023-02-09 NOTE — PROGRESS NOTES
Transition of Care Plan: home with home health-[place]      RUR: N/A    PCP F/U:     Disposition: home with home health     Transportation: daughter    Main Contact: Daughter: Alfie Dull: [phone]: Met with patient at the bedside. Introduced self and role of CM. Patient A&Ox4. Confirmed demographics. States she is independent and lives by herself at home. Therapy recommending home health. Would like this CM to discuss with daughter. 1411: Discussed home health with daughter. Choices provided, but no preference except that they provide all services for PT,OT,and ST and service her area. Referral sent to [facility] and they have accepted. Kristopher Paniagua RN, CRM    Residency:  two story home, with 10 steps to enter  Lives With: alone  Prior functioning:  independent   Prior DME used: reacher/grabber  Rehab history: OP therapy only  Pharmacy: [address] [place]    Reason for Admission:  CVA                   RUR Score:  N/A                   Plan for utilizing home health:  recommended by therapy         PCP: First and Last name:  Rusty Gaffney MD     Name of Practice:    Are you a current patient: Yes/No: yes   Approximate date of last visit: couple of months ago   Can you participate in a virtual visit with your PCP:                     Current Advanced Directive/Advance Care Plan: Full Code    Healthcare Decision Maker:   Click here to complete [address] including selection of the Healthcare Decision Maker Relationship (ie \"Primary\")  Daughter: Alfie Dull: [phone]                  Transition of Care Plan:  home with home health                   Care Management Interventions  PCP Verified by CM: Yes  Mode of Transport at Discharge:  Other (see comment) (family)  Physical Therapy Consult: Yes  Occupational Therapy Consult: Yes  Speech Therapy Consult: Yes  Support Systems: Child(paz)  Confirm Follow Up Transport: Family  Discharge Location  Patient Expects to be Discharged to[de-identified] Home with home health    Transition of Care Plan:     The Plan for Transition of Care is related to the following treatment goals: [facility]    The Patient and/or patient representative was provided with a choice of provider and agrees  with the discharge plan. Yes [x] No []    A Freedom of choice list was provided with basic dialogue that supports the patient's individualized plan of care/goals and shares the quality data associated with the providers.        Yes [x] No []

## 2023-02-09 NOTE — PROGRESS NOTES
Physical Therapy Note  02/09/2023    Orders received, chart reviewed and patient evaluated by physical therapy. Pending progression with skilled acute physical therapy, recommend:  Physical therapy at least 2 days/week in the home AND ensure assist and/or supervision for safety with ADLs/mobility from a safety/cognitive standpoint. May benefit from transition to [facility]?? Full evaluation to follow.      Veronica Almonte, PT, DPT

## 2023-02-09 NOTE — PROGRESS NOTES
Problem: Mobility Impaired (Adult and Pediatric)  Goal: *Acute Goals and Plan of Care (Insert Text)  Description:   FUNCTIONAL STATUS PRIOR TO ADMISSION: Patient was independent and active without use of DME.    HOME SUPPORT PRIOR TO ADMISSION: The patient lived alone with family to check in and provide assistance as needed. Daugther reports noting a progressive cognitive and speech decline over the last year. Physical Therapy Goals  Initiated 2/9/2023  1. Patient will move from supine to sit and sit to supine  and scoot up and down in bed with independence within 7 day(s). 2.  Patient will transfer from bed to chair and chair to bed with independence using the least restrictive device within 7 day(s). 3.  Patient will perform sit to stand with independence within 7 day(s). 4.  Patient will ambulate with supervision/set-up for 300 feet with the least restrictive device within 7 day(s). 5.  Patient will ascend/descend 4 stairs with handrail(s) with supervision/set-up within 7 day(s). 6.  Patient will improve Perez Balance score by 7 points within 7 days. Outcome: Progressing Towards Goal    PHYSICAL THERAPY EVALUATION- NEURO POPULATION  Patient: Verona Moritz (16 y.o. female)  Date: 2/9/2023  Primary Diagnosis: TIA (transient ischemic attack) [G45.9]       Precautions:   Fall      ASSESSMENT  Based on the objective data described below, the patient presents with impaired functional mobility as compared to baseline level 2* mild balance impairments, impaired cognition (STM, problem solving, etc), and impaired gait following admission for c/o R weakness, now resolved. CT and MRI negative for acute process. At baseline, she lives at home alone and is generally indep with BADLs and mobility however daughter reports progressive cognitive decline (putting earring in pill box; not taking meds; leaving coffee pot on, etc) and aphasia over the last year.      Received pt up in chair; pleasant and agreeable to participate in session. Strength, coordination, sensation, and vision in tact. Note significant word finding difficulties, flight of thought, and impaired STM throughout conversation. She was able to complete sit<>stands with SBA. Gait training initiated in room and hallway without AD per PLOF - initially demos slowed, shuffled pattern with increased lateral sway requiring min A for safety/stability - improved some with continued duration. Incorporated multi level reaching and dual tasks without overt LOB noted. Perez score indicates a moderate falls risk. Pt left up in chair at end of session with family present, NAD. At this time, anticipate that she is likely near her baseline mobility level however concerned from a cognitive and communication standpoint for discharge home alone. . May benefit from formal cognitive assessment. Recommend increased assist at home for safety (24/7) and HHPT. . May benefit from transition to [facility] . . ? Current Level of Function Impacting Discharge (mobility/balance): up to min A initially without AD; Functional Outcome Measure: The patient scored Total: 40/56 on the Perez Balance Assessment which is indicative of moderate fall risk. Other factors to consider for discharge: lives alone; impaired cognition and speech      Patient will benefit from skilled therapy intervention to address the above noted impairments. PLAN :  Recommendations and Planned Interventions: bed mobility training, transfer training, gait training, therapeutic exercises, neuromuscular re-education, patient and family training/education, and therapeutic activities      Frequency/Duration: Patient will be followed by physical therapy:  5 times a week to address goals.     Recommendation for discharge: (in order for the patient to meet his/her long term goals)  Physical therapy at least 2 days/week in the home AND ensure assist and/or supervision for safety with ADLs/mobility from a safety/cognitive standpoint     This discharge recommendation:  A follow-up discussion with the attending provider and/or case management is planned    IF patient discharges home will need the following DME: none         SUBJECTIVE:   Patient stated Well maybe a little . Erasto Wong You know. A[place] . Ladonna Shields (referring to being dizzy)    OBJECTIVE DATA SUMMARY:   HISTORY:    Past Medical History:   Diagnosis Date    GERD (gastroesophageal reflux disease)     Hypothyroidism     Intraductal papilloma 2012    Migraine     RA (rheumatoid arthritis) ([facility] [address]     remission     Past Surgical History:   Procedure Laterality Date    HX BREAST BIOPSY Right     benign surgical- papilloma    HX BREAST BIOPSY Right     ultrasound core benign-papilloma    HX  SECTION  1983    HX DILATION AND CURETTAGE      HX OTHER SURGICAL      DONNA RECTAL ABCESS    HX OTHER SURGICAL      last colonoscopy -        Personal factors and/or comorbidities impacting plan of care: PMHx    Home Situation  Home Environment: Private residence  # Steps to Enter: 5  Rails to Enter: Yes  Hand Rails : Bilateral (wide)  One/Two Story Residence: Two story, live on 1st floor  # of Interior Steps: 15  Interior Rails: Both (for half and then walls on either side for other half)  Living Alone: Yes  Support Systems: Child(paz)  Patient Expects to be Discharged to[de-identified] Home with home health  Current DME Used/Available at Home: Shower chair (reacher)  Tub or Shower Type: Shower    EXAMINATION/PRESENTATION/DECISION MAKING:   Critical Behavior:  Neurologic State: Alert  Orientation Level: Oriented X4  Cognition: Follows commands, Appropriate decision making  Safety/Judgement: Fall prevention, Home safety, Awareness of environment  Hearing: Auditory  Auditory Impairment: None  Skin:    Edema:   Range Of Motion:  AROM: Within functional limits  PROM: Within functional limits     Strength:    Strength:  Within functional limits    Tone & Sensation: Tone: Normal  Sensation: Intact      Coordination:  Coordination: Generally decreased, functional  Vision:   Tracking: Able to track stimulus in all quadrants w/o difficulty (slight difficulty with R LQ)  Diplopia: No  Corrective Lenses: Glasses  Functional Mobility:  Bed Mobility:  Rolling: Stand-by assistance  Supine to Sit: Stand-by assistance  Sit to Supine:  (pt left sitting in recliner)  Scooting: Stand-by assistance    Transfers:  Sit to Stand: Stand-by assistance  Stand to Sit: Stand-by assistance  Bed to Chair: Contact guard assistance    Balance:   Sitting: Intact  Standing: Impaired; Without support  Standing - Static: Good  Standing - Dynamic : Fair;Good    Ambulation/Gait Training:  Distance (ft): 200 Feet (ft)  Assistive Device: Gait belt  Ambulation - Level of Assistance: Minimal assistance;Contact guard assistance  Gait Description (WDL): Exceptions to WDL  Gait Abnormalities: Decreased step clearance;Shuffling gait  Base of Support: Center of gravity altered  Speed/Vicky: Slow;Shuffled  Step Length: Right shortened;Left shortened    Functional Measure  Perez Balance Test:    Sitting to Standin  Standing Unsupported: 4  Sitting with Back Unsupported: 4  Standing to Sittin  Transfers: 4  Standing Unsupported with Eyes Closed: 3  Standing Unsupported with Feet Together: 3  Reach Forward with Outstretched Arm: 3   Object: 3  Turn to Look Over Shoulders: 2  Turn 360 Degrees: 1  Alternate Foot on Step/Stool: 1  Standing Unsupported One Foot in Front: 2  Stand on One Le  Total: 40/56         56=Maximum possible score;   0-20=High fall risk  21-40=Moderate fall risk   41-56=Low fall risk        Physical Therapy Evaluation Charge Determination   History Examination Presentation Decision-Making   MEDIUM  Complexity : 1-2 comorbidities / personal factors will impact the outcome/ POC  LOW Complexity : 1-2 Standardized tests and measures addressing body structure, function, activity limitation and / or participation in recreation  LOW Complexity : Stable, uncomplicated  MEDIUM Complexity : FOTO score of 26-74      Based on the above components, the patient evaluation is determined to be of the following complexity level: LOW     Pain Rating:  none    Activity Tolerance:   Good      After treatment patient left in no apparent distress:   Sitting in chair, Call bell within reach, Bed / chair alarm activated, and Caregiver / family present    COMMUNICATION/EDUCATION:   The patients plan of care was discussed with: Occupational therapist, Speech therapist, and Registered nurse. Patient and/or family was verbally educated on the BE FAST acronym for signs/symptoms of CVA and TIA. BE FAST was written on patient's communication board  for visual education and reinforcement. All questions answered with patient indicating good understanding. Fall prevention education was provided and the patient/caregiver indicated understanding., Patient/family have participated as able in goal setting and plan of care. , and Patient/family agree to work toward stated goals and plan of care.     Thank you for this referral.  Prisca Carbajal, PT, DPT   Time Calculation: 31 mins

## 2023-02-09 NOTE — DISCHARGE INSTRUCTIONS
You were admitted and treated for a TIA.    Neurology evaluation and MRI were completed and recommending the following on discharge  Monitor blood pressure and heart rates - keep a home log for follow up  - cardiac event monitoring and consider cardiology evaluation for erratic blood pressures and heart rates  - continue taking 81mg aspirin daily  - low fat, low cholesterol diet  - continue taking stain medication- atorvastatin  Follow up with primary care MD for future medication refills   to set up home Occupation and Physical Therapy and Speech therapy

## 2023-02-09 NOTE — H&P
[address]   HISTORY AND PHYSICAL    Name:  Nicolás Roper  MR#:  [id number]  :  1945  ACCOUNT #:  [de-identified]  ADMIT DATE:  2023      The patient was seen, evaluated, and admitted by me on 2023. PRIMARY CARE PHYSICIAN:  Pamela Ferrell MD    SOURCE OF INFORMATION:  The patient and review of ED electronic medical records. CHIEF COMPLAINT:  Right-sided weakness. HISTORY OF PRESENT ILLNESS:  This is a 42-year-old woman with past medical history significant for hypothyroidism, rheumatoid arthritis, and migraine headache; presented at the [facility] emergency room at [facility] with right-sided weakness. The patient's symptoms started 20 minutes prior to arrival at the emergency room. The right-sided weakness was associated with paresthesia. It was reported that the patient's last well known  time was approximately 11:15 a.m. It was also reported that the patient has mild aphasia at baseline, but the right-sided weakness is new. The patient presented at the emergency room as level I code stroke. The CT scan of the head done on arrival at the emergency room did not show any acute pathology. CTA of the head and neck also did not show large vessel occlusion. The teleneurologist was consulted by the emergency room physician. The patient was seen by the teleneurologist.  The patient's right-sided weakness seems to be improving, because of that it was determined that the patient is not a t-PA candidate but she was referred to the hospitalist service for admission. No associated fever, rigors, or chills. No record of prior admission to this hospital.    PAST MEDICAL HISTORY:  Rheumatoid arthritis and hypothyroidism. ALLERGIES:  NO KNOWN DRUG ALLERGIES. MEDICATIONS:  1. Synthroid 50 mcg daily. 2.  Zantac 150 mg twice daily. FAMILY HISTORY:  This was reviewed. Her mother had Alzheimer's disease and thyroid disease.   Her father had prostate cancer. PAST SURGICAL HISTORY:  This is significant for right breast biopsy and  section. SOCIAL HISTORY:  The patient is a former smoker. No history of alcohol abuse. REVIEW OF SYSTEMS:  HEAD, EYES, EARS, NOSE, AND THROAT:  No headache, no dizziness, no blurring of vision, no photophobia. RESPIRATORY SYSTEM:  No cough, no shortness of breath, no hemoptysis. CARDIOVASCULAR SYSTEM:  No chest pain, no orthopnea, no palpitations. GASTROINTESTINAL SYSTEM:  No nausea or vomiting. No diarrhea, no constipation. GENITOURINARY SYSTEM:  No dysuria, no urgency, no frequency. All other systems are reviewed and they are negative. PHYSICAL EXAMINATION:  GENERAL APPEARANCE:  The patient appeared ill, in moderate distress. VITAL SIGNS:  On arrival at the emergency room; temperature 97.8, pulse 56, respiratory rate 16, blood pressure 135/85, oxygen saturation 99%. HEENT:  Head:  Normocephalic, atraumatic. Eyes:  Normal eye movement. No redness, no drainage, no discharge. Ears:  Normal external ears with no obvious drainage. Nose:  No deformity, no drainage. Mouth and Throat:  No visible oral lesion. Dry oral mucosa. NECK:  Neck is supple. No JVD, no thyromegaly. CHEST:  Clear breath sounds. No wheezing, no crackles. HEART:  Normal S1 and S2, regular. No clinically appreciable murmur. ABDOMEN:  Soft, nontender. Normal bowel sounds. CNS:  Alert and oriented x3. Mild aphasia noted. EXTREMITIES:  No edema. Pulses 2+ bilaterally. MUSCULOSKELETAL SYSTEM:  No obvious joint deformity or swelling. SKIN:  No active skin lesions seen in the exposed part of the body. PSYCHIATRY:  Normal mood and affect. LYMPHATIC SYSTEM:  No cervical lymphadenopathy. DIAGNOSTIC DATA:  EKG shows sinus bradycardia. No significant ST or T-waves abnormalities. CT scan of the head without contrast, negative. CTA of the head and neck, no evidence of significant stenosis or aneurysm.     LABORATORY DATA: Hematology:  WBC 6.8, hemoglobin 12.8, hematocrit 39.1, platelets 780. Coagulation profile: INR 1.0, PT 10.2. Chemistry:  Sodium 139, potassium 4.2, chloride 103, CO2 of 26, glucose 95, BUN 13, creatinine 0.95, calcium 9.5, total bilirubin 0.8, ALT 20, AST 25, alkaline phosphatase 64, total protein 8.0, albumin level 4.0, globulin t 4.0.    ASSESSMENT  1. Suspected acute cerebrovascular accident. 2.  Hypothyroidism. 3.  Rheumatoid arthritis. PLAN:  1. Suspected acute CVA. We will admit the patient for further evaluation and treatment. We will obtain MRI of the brain and echocardiogram.  We will check lipid profile. We will also check hemoglobin A1c level. We will start the patient on aspirin and Lipitor. Inpatient Neurology consult will be requested to assist in further evaluation and treatment. We will check Q87 and folic acid level. We will also check C-reactive protein level to evaluate the patient for systemic inflammation. We will check urine drug screen. We will also check urinalysis and chest x-ray to evaluate the patient for any sources of infection. We will await further recommendation from the inpatient neurologist.  2.  Hypothyroidism: We will continue with Synthroid. We will check a TSH level. 3.  Rheumatoid arthritis: This is in remission. We will check C-reactive protein level. OTHER ISSUES:  Code status: The patient is a full code. We will place the patient on Lovenox for DVT prophylaxis. FUNCTIONAL STATUS PRIOR TO ADMISSION:  The patient came from home. The patient is ambulatory with no assistive device. COVID PRECAUTION:  The patient was wearing a face mask. I was wearing a face mask and gloves for this patient's encounter.         Stan Colindres MD      [id number]  D:  02/09/2023 2:55  T:  02/09/2023 4:22  JOB #:  [id number]  CC:  Adwoa Bradley MD

## 2023-02-10 ENCOUNTER — TELEPHONE (OUTPATIENT)
Dept: CARDIOLOGY CLINIC | Age: 78
End: 2023-02-10

## 2023-02-10 NOTE — DISCHARGE SUMMARY
Discharge Summary       PATIENT ID: Alyson Little  MRN: [id number]   YOB: 1945    DATE OF ADMISSION: 2/8/2023 12:01 PM    DATE OF DISCHARGE: 2/9/23   PRIMARY CARE PROVIDER: Hilda Wilburn MD     ATTENDING PHYSICIAN: Blake Chu  DISCHARGING PROVIDER: Liat Godfrey MD      CONSULTATIONS: IP CONSULT TO NEUROLOGY  IP CONSULT TO CARDIOLOGY    PROCEDURES/SURGERIES: * No surgery found *    ADMISSION SUMMARY AND HOSPITAL COURSE:    This is a 68-year-old woman with past medical history significant for hypothyroidism, rheumatoid arthritis, and migraine headache; presented at the [facility] emergency room at [facility] with right-sided weakness. The patient's symptoms started 20 minutes prior to arrival at the emergency room. The right-sided weakness was associated with paresthesia. It was reported that the patient's last well known  time was approximately 11:15 a.m. It was also reported that the patient has mild aphasia at baseline, but the right-sided weakness is new. The patient presented at the emergency room as level I code stroke. The CT scan of the head done on arrival at the emergency room did not show any acute pathology. CTA of the head and neck also did not show large vessel occlusion. The teleneurologist was consulted by the emergency room physician. The patient was seen by the teleneurologist.  The patient's right-sided weakness seems to be improving, because of that it was determined that the patient is not a t-PA candidate but she was referred to the hospitalist service for admission. No associated fever, rigors, or chills. No record of prior admission to this hospital.    Nidhi Mario / PLAN:        1. TIA- with symptoms resolved ; Neurology consulted and recs are below  Cont aspirin and statin- outpatient cardiac monitor; normal ECHO, no UTI  2. Hypothyroidism: normal TSH- cont Synthroid. 3.  Rheumatoid arthritis: This is in remission.     4- HLD- home on statin med  5- suspected early dementia- outpatient neuropsych   6- erratic BP and HR- consider outpatient cardiology eval  Home monitoring  CM to set up [facility]- OT/PT/Speech    PENDING TEST RESULTS:   At the time of discharge the following test results are still pending: none     ADDITIONAL CARE RECOMMENDATIONS:   You were admitted and treated for a TIA. Neurology evaluation and MRI were completed and recommending the following on discharge  Monitor blood pressure and heart rates - keep a home log for follow up  - cardiac event monitoring and consider cardiology evaluation for erratic blood pressures and heart rates  - continue taking 81mg aspirin daily  - low fat, low cholesterol diet  - continue taking stain medication- atorvastatin  Follow up with primary care MD for future medication refills   to set up home Occupation and Physical Therapy and Speech therapy     DIET: Low fat, Low cholesterol    ACTIVITY: Activity as tolerated    EQUIPMENT needed: none    NOTIFY YOUR PHYSICIAN FOR ANY OF THE FOLLOWING:   Fever over 101 degrees for 24 hours. Chest pain, shortness of breath, fever, chills, nausea, vomiting, diarrhea, change in mentation, falling, weakness, bleeding. Severe pain or pain not relieved by medications, as well as any other signs or symptoms that you may have questions about.     FOLLOW UP APPOINTMENTS:    Follow-up Information       Follow up With Specialties Details Why [address]  Call If not contacted in 24-48 hours : [phone]    Zenon Dozier MD Neurology Schedule an appointment as soon as possible for a visit for hospital follow up [facility] [address] [place] [address]      Thomas Gaitan NP Cardiovascular Disease Physician Schedule an appointment as soon as possible for a visit for cardiac issues [address]  [address] Omar Marin MD Internal Medicine Physician Schedule an appointment as soon as possible for a visit in 2 week(s)  [address] [facility]  [phone]               DISCHARGE MEDICATIONS:  Current Discharge Medication List        START taking these medications    Details   aspirin 81 mg chewable tablet Take 1 Tablet by mouth daily. Indications: stroke prevention  Qty: 30 Tablet, Refills: 11  Start date: 2/10/2023      atorvastatin (LIPITOR) 40 mg tablet Take 1 Tablet by mouth nightly. Qty: 30 Tablet, Refills: 5  Start date: 2/9/2023           CONTINUE these medications which have NOT CHANGED    Details   VITAMIN B COMPLEX PO Take  by mouth. Takes one po occasionally. Super B complex. levothyroxine (SYNTHROID) 50 mcg tablet Take 50 mcg by mouth Daily (before breakfast). ranitidine (ZANTAC) 150 mg tablet Take 150 mg by mouth two (2) times a day.            STOP taking these medications       naproxen sodium 220 mg cap Comments:   Reason for Stopping:               DISPOSITION:    Home With:  x OT x PT x HH  RN       Long term SNF/Inpatient Rehab   x Independent living    Hospice    Other:     PATIENT CONDITION AT DISCHARGE:     Functional status    Poor    x Deconditioned     Independent      Cognition     Lucid    x Forgetful     Dementia      Catheters/lines (plus indication)    Crum     PICC     PEG    x None      Code status   x  Full code     DNR      PHYSICAL EXAMINATION AT DISCHARGE:  Patient Vitals for the past 24 hrs:   Temp Pulse Resp BP SpO2   02/09/23 1400 -- 62 -- -- --   02/09/23 1200 -- 77 -- -- --   02/09/23 1039 98 °F (36.7 °C) (!) 58 13 (!) 139/52 --   02/09/23 1000 -- (!) 50 -- -- --   02/09/23 0926 -- 61 11 (!) 134/49 --   02/09/23 0800 -- -- -- -- 98 %   02/09/23 0627 97.9 °F (36.6 °C) (!) 53 12 (!) 139/52 98 %   02/09/23 0400 -- (!) 50 -- -- --   02/09/23 0200 -- (!) 49 -- -- --   02/09/23 0100 98.1 °F (36.7 °C) (!) 55 17 (!) 123/50 98 %   02/08/23 2204 97.8 °F (36.6 °C) 72 22 (!) 128/97 --   02/08/23 2200 -- (!) 46 -- -- --   02/08/23 2000 -- (!) 46 -- -- --        General:          Alert, cooperative, no distress, appears stated age. HEENT:           Atraumatic, anicteric sclerae, pink conjunctivae  Neck:               Supple, symmetrical  Lungs:             Clear to auscultation bilaterally. No Wheezing or Rhonchi. No rales. Chest wall:      No tenderness  No Accessory muscle use. Heart:              Regular  rhythm,  No  murmur    Abdomen:        Soft, non-tender. Not distended. Bowel sounds normal  Extremities:     No cyanosis. No clubbing,  no edema  Skin:                Not pale. Not Jaundiced  No rashes   Psych:             Not anxious or agitated.   Neurologic:      Alert, moves all extremities, answers questions appropriately and responds to commands     Recent Results (from the past 24 hour(s))   LIPID PANEL    Collection Time: 02/09/23  4:18 AM   Result Value Ref Range    Cholesterol, total 202 (H) <200 MG/DL    Triglyceride 102 <150 MG/DL    HDL Cholesterol 73 MG/DL    LDL, calculated 108.6 (H) 0 - 100 MG/DL    VLDL, calculated 20.4 MG/DL    CHOL/HDL Ratio 2.8 0.0 - 5.0     HEMOGLOBIN A1C WITH EAG    Collection Time: 02/09/23  4:18 AM   Result Value Ref Range    Hemoglobin A1c 5.4 4.0 - 5.6 %    Est. average glucose 108 mg/dL   TSH 3RD GENERATION    Collection Time: 02/09/23  4:18 AM   Result Value Ref Range    TSH 3.57 0.36 - 3.74 uIU/mL   MAGNESIUM    Collection Time: 02/09/23  4:18 AM   Result Value Ref Range    Magnesium 2.4 1.6 - 2.4 mg/dL   PHOSPHORUS    Collection Time: 02/09/23  4:18 AM   Result Value Ref Range    Phosphorus 3.5 2.6 - 4.7 MG/DL   METABOLIC PANEL, COMPREHENSIVE    Collection Time: 02/09/23  4:18 AM   Result Value Ref Range    Sodium 140 136 - 145 mmol/L    Potassium 3.6 3.5 - 5.1 mmol/L    Chloride 109 (H) 97 - 108 mmol/L    CO2 24 21 - 32 mmol/L    Anion gap 7 5 - 15 mmol/L    Glucose 85 65 - 100 mg/dL    BUN 10 6 - 20 MG/DL    Creatinine 0.78 0.55 - 1.02 MG/DL    BUN/Creatinine ratio 13 12 - 20      eGFR >60 >60 ml/min/1.73m2    Calcium 9.4 8.5 - 10.1 MG/DL    Bilirubin, total 0.9 0.2 - 1.0 MG/DL    ALT (SGPT) 20 12 - 78 U/L    AST (SGOT) 14 (L) 15 - 37 U/L    Alk.  phosphatase 62 45 - 117 U/L    Protein, total 7.2 6.4 - 8.2 g/dL    Albumin 3.6 3.5 - 5.0 g/dL    Globulin 3.6 2.0 - 4.0 g/dL    A-G Ratio 1.0 (L) 1.1 - 2.2     FOLATE    Collection Time: 02/09/23  4:18 AM   Result Value Ref Range    Folate 26.1 (H) 5.0 - 21.0 ng/mL   VITAMIN B12    Collection Time: 02/09/23  4:18 AM   Result Value Ref Range    Vitamin B12 340 193 - 986 pg/mL   C REACTIVE PROTEIN, QT    Collection Time: 02/09/23  4:18 AM   Result Value Ref Range    C-Reactive protein <0.29 0.00 - 0.60 mg/dL   TROPONIN-HIGH SENSITIVITY    Collection Time: 02/09/23  4:18 AM   Result Value Ref Range    Troponin-High Sensitivity 8 0 - 51 ng/L   ECHO ADULT COMPLETE    Collection Time: 02/09/23 10:53 AM   Result Value Ref Range    IVSd 0.9 0.6 - 0.9 cm    LVIDd 4.1 3.9 - 5.3 cm    LVIDs 2.5 cm    LVOT Diameter 1.7 cm    LVPWd 0.9 0.6 - 0.9 cm    EF BP 67 55 - 100 %    LV Ejection Fraction A2C 68 %    LV Ejection Fraction A4C 64 %    LV EDV A2C 43 mL    LV EDV A4C 72 mL    LV EDV BP 57 56 - 104 mL    LV ESV A2C 14 mL    LV ESV A4C 26 mL    LV ESV BP 19 19 - 49 mL    LVOT Peak Gradient 4 mmHg    LVOT Peak Velocity 1.0 m/s    RVSP 33 mmHg    LA Diameter 3.1 cm    LA Volume A/L 34 mL    LA Volume 2C 23 22 - 52 mL    LA Volume 4C 35 22 - 52 mL    Est. RA Pressure 3 mmHg    AV Area by Peak Velocity 1.6 cm2    AV Peak Gradient 10 mmHg    AV Peak Velocity 1.6 m/s    MV A Velocity 0.71 m/s    MV E Wave Deceleration Time 289.5 ms    MV E Velocity 0.75 m/s    LV E' Lateral Velocity 12 cm/s    LV E' Septal Velocity 8 cm/s    MV PHT 84.0 ms    MV Area by PHT 2.6 cm2    MR Peak Gradient 77 mmHg    MR Peak Velocity 4.4 m/s    PV Peak Gradient 3 mmHg    PV Max Velocity 0.9 m/s    TAPSE 2.2 1.7 cm    TR Peak Gradient 30 mmHg    TR Max Velocity 2.73 m/s    Aortic Root 3.1 cm    Fractional Shortening 2D 39 28 - 44 %    LV ESV Index BP 11 mL/m2    LV EDV Index BP 34 mL/m2    LV ESV Index A4C 16 mL/m2    LV EDV Index A4C 43 mL/m2    LV ESV Index A2C 8 mL/m2    LV EDV Index A2C 26 mL/m2    LVIDd Index 2.46 cm/m2    LVIDs Index 1.50 cm/m2    LV RWT Ratio 0.44     LV Mass 2D 114.1 67 - 162 g    LV Mass 2D Index 68.3 43 - 95 g/m2    MV E/A 1.06     E/E' Ratio (Averaged) 7.81     E/E' Lateral 6.25     E/E' Septal 9.38     LA Volume Index A/L 20 16 - 34 mL/m2    LVOT Area 2.3 cm2    LA Volume Index 2C 14 (A) 16 - 34 mL/m2    LA Volume Index 4C 21 16 - 34 mL/m2    LA Size Index 1.86 cm/m2    LA/AO Root Ratio 1.00     Ao Root Index 1.86 cm/m2    AV Velocity Ratio 0.63     KELLEN/BSA Peak Velocity 1.0 cm2/m2   URINALYSIS W/MICROSCOPIC    Collection Time: 02/09/23  4:56 PM   Result Value Ref Range    Color YELLOW/STRAW      Appearance CLOUDY (A) CLEAR      Specific gravity 1.008 1.003 - 1.030      pH (UA) 6.5 5.0 - 8.0      Protein Negative NEG mg/dL    Glucose Negative NEG mg/dL    Ketone Negative NEG mg/dL    Bilirubin Negative NEG      Blood Negative NEG      Urobilinogen 1.0 0.2 - 1.0 EU/dL    Nitrites Negative NEG      Leukocyte Esterase TRACE (A) NEG      WBC 0-4 0 - 4 /hpf    RBC 0-5 0 - 5 /hpf    Epithelial cells FEW FEW /lpf    Bacteria Negative NEG /hpf    Hyaline cast 0-2 0 - 5 /lpf      MRI BRAIN WO CONTRAST     COMPARISON:  2/8/2023     TECHNIQUE:  Multiplanar multisequence acquisition without contrast of the brain. FINDINGS:       Ventricles: Moderate ventriculomegaly likely related to generalized parenchymal  volume loss. Brain Parenchyma/Brainstem:  Mild to moderate patchy supratentorial T2  hyperintense signal. No acute infarction. Intracranial Hemorrhage:  None. Basal Cisterns:  Normal.   Flow Voids:  Normal.  Additional Comments:  N/A. IMPRESSION     Chronic white matter disease with no acute infarction.     Echo Findings    Left Ventricle Normal left ventricular systolic function with a visually estimated EF of 65 - 70%. Left ventricle size is normal. Normal wall thickness. Normal wall motion. Normal diastolic function. Left Atrium Left atrium size is normal.   Interatrial Septum No interatrial shunt visualized with color Doppler. Right Ventricle Right ventricle size is normal. Normal wall thickness. Normal systolic function. Right Atrium Right atrium size is normal.   Aortic Valve Valve structure is normal. No regurgitation. No stenosis. Mitral Valve Valve structure is normal. No regurgitation. No stenosis noted. Tricuspid Valve Valve structure is normal. No regurgitation. No stenosis noted. Pulmonic Valve Valve structure is normal. No regurgitation. No stenosis noted. Aorta Normal sized aortic root and ascending aorta. IVC/Hepatic Veins IVC diameter is less than or equal to 21 mm and decreases greater than 50% during inspiration; therefore the estimated right atrial pressure is normal (~3 mmHg). IVC size is normal.   Pericardium No pericardial effusion.        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

## 2023-02-10 NOTE — TELEPHONE ENCOUNTER
Enrolled with [employer] - Ordered and being shipped to patient's home address on file. ETA within 5-7 business days.         I needs 2 week holter monitors sent to patients please for TIA  Received: Maru Mclean, RENNY Gonzalez  Can you please arrange for 2 week holter monitor to be sent to     [facility] 1945     Thanks   Morena Maier

## 2023-02-13 ENCOUNTER — TELEPHONE (OUTPATIENT)
Dept: NEUROLOGY | Age: 78
End: 2023-02-13

## 2023-02-13 DIAGNOSIS — G91.2 NPH (NORMAL PRESSURE HYDROCEPHALUS) (HCC): Primary | ICD-10-CM

## 2023-02-13 NOTE — TELEPHONE ENCOUNTER
Patient was seen in the ER by Yolande Juarez and she referred the patient to [facility]. The need referral paperwork before the appt. Can be made. Please call to discuss.

## 2023-02-20 NOTE — TELEPHONE ENCOUNTER
Faxed over [address] [facility] referral along with consult note at [phone]. I have called the patient's son and given him the number to the clinic. Informed him if he does not hear within a few days to please call them.

## 2023-03-09 ENCOUNTER — TELEPHONE (OUTPATIENT)
Dept: CARDIOLOGY CLINIC | Age: 78
End: 2023-03-09

## 2023-03-14 ENCOUNTER — OFFICE VISIT (OUTPATIENT)
Facility: CLINIC | Age: 78
End: 2023-03-14

## 2023-03-14 VITALS
BODY MASS INDEX: 26.4 KG/M2 | HEART RATE: 56 BPM | RESPIRATION RATE: 16 BRPM | HEIGHT: 63 IN | OXYGEN SATURATION: 98 % | WEIGHT: 149 LBS | SYSTOLIC BLOOD PRESSURE: 110 MMHG | DIASTOLIC BLOOD PRESSURE: 60 MMHG

## 2023-03-14 DIAGNOSIS — G45.9 TIA (TRANSIENT ISCHEMIC ATTACK): Primary | ICD-10-CM

## 2023-03-14 RX ORDER — GLUCOSAMINE SULFATE 1500 MG
POWDER IN PACKET (EA) ORAL DAILY
COMMUNITY

## 2023-03-14 RX ORDER — DONEPEZIL HYDROCHLORIDE 10 MG/1
TABLET, FILM COATED ORAL
COMMUNITY
Start: 2023-01-16

## 2023-03-14 RX ORDER — ESCITALOPRAM OXALATE 10 MG/1
TABLET ORAL
COMMUNITY
Start: 2022-12-14

## 2023-03-16 NOTE — TELEPHONE ENCOUNTER
Holter monitor for 2 weeks. Analysis for 11 days 21 hours starting on February 15 of February 18, 2023. No significant arrhythmia seen  No A-fib AV block pause or VT  Average heart rate is 64 with heart rate a minimum of 44 and maximum 164. Control rate 59% bradycardia 40%. SVE's 0.1% PVC 0.05%. There were 2 patient triggered events these did not correlate to any significant arrhythmia. There was 1 run of SVT at 14 beats fastest at 164 was 3 beats. There were all 16 occurrences of SVT as above. Occasional brief runs of SVT are noted overall the end of occurrences fairly short with the longest at 14 beats. Patient had follow-up appointment to discuss results. No future appointments.

## 2023-03-20 ENCOUNTER — TELEPHONE (OUTPATIENT)
Dept: CARDIOLOGY CLINIC | Age: 78
End: 2023-03-20

## 2023-03-20 NOTE — TELEPHONE ENCOUNTER
Received fax from [phone] [facility] [address] Josh Kim office requesting medication clearance for aspirin 7 days prior to LP infusion procedure and if candidate for  shunt surgery than 7 days prior to surgery and 14 days after.     Fax: [phone]

## 2023-03-22 NOTE — TELEPHONE ENCOUNTER
Brian Ervin MD  You 20 hours ago (7:16 PM)     VS  Can hold ASA as requested for 7 days pior to procedure and if needed 14 days after if she has a  shunt placed   Fax [phone]      Fax clearance. Confirmation received.

## 2023-03-30 NOTE — PROGRESS NOTES
Lanre Haro     1945       Georgia Mcdowell MD, [facility]  Date of [id number]   PCP is Randi Blair MD   [facility] [place]  [facility]  HPI:  Lanre Haro is a 70 y.o. female   New patient seen in hospital by neurology and primary team and requested for outpatient monitoring. She had [facility]LP OT and PT with home health care. She had a TIA on 2/8/2023. She is here to review her heart monitor. Holter monitor for 2 weeks. Analysis for 11 days 21 hours starting on February 15 of February 18, 2023. No significant arrhythmia seen  No A-fib AV block pause or VT  Average heart rate is 64 with heart rate a minimum of 44 and maximum 164. Control rate 59% bradycardia 40%. SVE's 0.1% PVC 0.05%. There were 2 patient triggered events these did not correlate to any significant arrhythmia. There was 1 run of SVT at 14 beats fastest at 164 was 3 beats. There were all 16 occurrences of SVT as above. Occasional brief runs of SVT are noted overall the end of occurrences fairly short with the longest at 14 beats. Patient had follow-up appointment to discuss results. Today. .  Denies chest pain, edema, syncope or shortness of breath at rest   Has no tachycardia , palpitations or sense of arrythmia    02/08/23    ECHO ADULT COMPLETE 02/09/2023 2/9/2023    Interpretation Summary    Left Ventricle: Normal left ventricular systolic function with a visually estimated EF of 65 - 70%. Left ventricle size is normal. Normal wall thickness. Normal wall motion. Normal diastolic function. Negative bubble study. Key CAD CHF Meds               aspirin 81 mg chewable tablet (Taking) Take 1 Tablet by mouth daily. Indications: stroke prevention    atorvastatin (LIPITOR) 40 mg tablet (Taking) Take 1 Tablet by mouth nightly. Assessment/Plan:     Stroke work-up with TIA had requested monitor here to discuss results. Monitor shows no significant arrhythmia.   She has no chest pain cardiovascular risk factors are moderate. Reviewed results and patient will follow-up with PCP and neurology     Impression:   1. TIA (transient ischemic attack)       Cardiac History:   No specialty comments available. No future appointments. Patient Care Team:  Fauzia Carty MD as PCP - General (Internal Medicine Physician)   ROS-except as noted above. . A complete cardiac and respiratory are reviewed and negative except as above ; Resp-denies wheezing  or productive cough,. Const- No unusual weight loss or fever; Neuro-no recent seizure or CVA ; GI- No BRBPR, abdom pain, bloating ; - no  hematuria   see supplement sheet, initialed and to be scanned by staff    Past Medical History:   Diagnosis Date    GERD (gastroesophageal reflux disease)     Hypothyroidism     Intraductal papilloma 12/20/2012    Migraine     RA (rheumatoid arthritis) ([facility] [address]     remission      Social Hx= reports that she has quit smoking. She has never used smokeless tobacco. She reports current alcohol use. Family Hx- family history includes Alzheimer's Disease in her mother; Cancer in her father and paternal grandmother; Diabetes in her son; Other in her mother; Thyroid Disease in her mother. No Known Allergies     Exam and Labs:  Visit Vitals  /60   Pulse (!) 56   Resp 16   Ht 5' 3\" (1.6 m)   Wt 149 lb (67.6 kg)   SpO2 98%   BMI 26.39 kg/m²    @Constitutional:  NAD, comfortable  Head: NC,AT. Eyes: No scleral icterus. Neck:  Neck supple. No JVD present. Throat: moist mucous membranes. Chest: Effort normal & normal respiratory excursion . Neurological: alert, conversant and oriented . Skin: Skin is not cold. No obvious systemic rash noted. Not diaphoretic. No erythema. Psychiatric:  Grossly normal mood and affect. Behavior appears normal. Extremities:  no clubbing or cyanosis. Abdomen: non distended    Lungs:breath sounds normal. No stridor. distress, wheezes or  Rales.   Heart:    normal rate, regular rhythm, normal S1, S2, no murmurs, rubs, clicks or gallops , PMI non displaced. Edema: Edema is none. Lab Results   Component Value Date/Time    Cholesterol, total 202 (H) 02/09/2023 04:18 AM    HDL Cholesterol 73 02/09/2023 04:18 AM    LDL, calculated 108.6 (H) 02/09/2023 04:18 AM    Triglyceride 102 02/09/2023 04:18 AM    CHOL/HDL Ratio 2.8 02/09/2023 04:18 AM     Lab Results   Component Value Date/Time    Sodium 140 02/09/2023 04:18 AM    Potassium 3.6 02/09/2023 04:18 AM    Chloride 109 (H) 02/09/2023 04:18 AM    CO2 24 02/09/2023 04:18 AM    Anion gap 7 02/09/2023 04:18 AM    Glucose 85 02/09/2023 04:18 AM    BUN 10 02/09/2023 04:18 AM    Creatinine 0.78 02/09/2023 04:18 AM    BUN/Creatinine ratio 13 02/09/2023 04:18 AM    GFR est AA >60 12/07/2012 01:00 PM    GFR est non-AA >60 12/07/2012 01:00 PM    Calcium 9.4 02/09/2023 04:18 AM      Wt Readings from Last 3 Encounters:   03/14/23 149 lb (67.6 kg)   02/09/23 141 lb 1.5 oz (64 kg)   01/06/23 146 lb 9.7 oz (66.5 kg)      BP Readings from Last 3 Encounters:   03/14/23 110/60   02/09/23 (!) 139/52   01/06/23 (!) 122/51      Current Outpatient Medications   Medication Sig    cholecalciferol (Vitamin D3) 25 mcg (1,000 unit) cap Take  by mouth daily. donepeziL (ARICEPT) 10 mg tablet     escitalopram oxalate (LEXAPRO) 10 mg tablet     aspirin 81 mg chewable tablet Take 1 Tablet by mouth daily. Indications: stroke prevention    atorvastatin (LIPITOR) 40 mg tablet Take 1 Tablet by mouth nightly. levothyroxine (SYNTHROID) 50 mcg tablet Take 50 mcg by mouth Daily (before breakfast). raNITIdine (ZANTAC) 150 mg tablet Take 150 mg by mouth as needed. VITAMIN B COMPLEX PO Take  by mouth. Takes one po occasionally. Super B complex. (Patient not taking: Reported on 3/14/2023)     No current facility-administered medications for this visit. Impression see above.

## 2023-12-25 ENCOUNTER — APPOINTMENT (OUTPATIENT)
Facility: HOSPITAL | Age: 78
End: 2023-12-25

## 2023-12-25 ENCOUNTER — HOSPITAL ENCOUNTER (EMERGENCY)
Facility: HOSPITAL | Age: 78
Discharge: HOME | End: 2023-12-25
Attending: EMERGENCY MEDICINE

## 2023-12-25 VITALS
SYSTOLIC BLOOD PRESSURE: 106 MMHG | BODY MASS INDEX: 29.48 KG/M2 | TEMPERATURE: 97.4 F | RESPIRATION RATE: 16 BRPM | OXYGEN SATURATION: 96 % | DIASTOLIC BLOOD PRESSURE: 45 MMHG | WEIGHT: 166.45 LBS | HEART RATE: 58 BPM
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
## 2023-12-25 DIAGNOSIS — S01.81XA FACIAL LACERATION, INITIAL ENCOUNTER: Primary | ICD-10-CM

## 2023-12-25 DIAGNOSIS — S09.90XA CLOSED HEAD INJURY, INITIAL ENCOUNTER: ICD-10-CM

## 2023-12-25 PROCEDURE — 6370000000 HC RX 637 (ALT 250 FOR IP): Performed by: STUDENT IN AN ORGANIZED HEALTH CARE EDUCATION/TRAINING PROGRAM

## 2023-12-25 PROCEDURE — 12052 INTMD RPR FACE/MM 2.6-5.0 CM: CPT

## 2023-12-25 PROCEDURE — 2500000003 HC RX 250 WO HCPCS: Performed by: STUDENT IN AN ORGANIZED HEALTH CARE EDUCATION/TRAINING PROGRAM

## 2023-12-25 PROCEDURE — 72125 CT NECK SPINE W/O DYE: CPT

## 2023-12-25 PROCEDURE — 99284 EMERGENCY DEPT VISIT MOD MDM: CPT

## 2023-12-25 PROCEDURE — 70450 CT HEAD/BRAIN W/O DYE: CPT

## 2023-12-25 RX ORDER — ACETAMINOPHEN 500 MG
1000 TABLET ORAL
Status: COMPLETED | OUTPATIENT
Start: 2023-12-25 | End: 2023-12-25

## 2023-12-25 RX ORDER — LIDOCAINE HYDROCHLORIDE AND EPINEPHRINE 10; 10 MG/ML; UG/ML
10 INJECTION, SOLUTION INFILTRATION; PERINEURAL ONCE
Status: COMPLETED | OUTPATIENT
Start: 2023-12-25 | End: 2023-12-25

## 2023-12-25 RX ADMIN — LIDOCAINE HYDROCHLORIDE,EPINEPHRINE BITARTRATE 10 ML: 10; .01 INJECTION, SOLUTION INFILTRATION; PERINEURAL at 13:28

## 2023-12-25 RX ADMIN — ACETAMINOPHEN 1000 MG: 500 TABLET ORAL at 13:29

## 2023-12-25 NOTE — ED NOTES

## 2023-12-25 NOTE — ED PROVIDER NOTES
[facility] EMERGENCY CTR  EMERGENCY DEPARTMENT ENCOUNTER      Pt Name: Reema Ybarra  MRN: [id number]  [address] [facility] 1945  Date of evaluation: 12/25/2023  Provider: Daryl Mcmillan       Chief Complaint   Patient presents with    Fall    Head Injury    Laceration         HISTORY OF PRESENT ILLNESS   (Location/Symptom, Timing/Onset, Context/Setting, Quality, Duration, Modifying Factors, Severity)  Note limiting factors. 70-year-old female presents to ED with right forehead laceration status post fall. Patient presents with daughter who reports that earlier today prior to arrival she was standing up to get up off the couch when her foot got caught on the corner of the coffee table, causing her to fall forward and the edge of her glasses cut above her right eyebrow. She denies any loss of consciousness and now denies any headache, vision changes, nausea, vomiting, chest pain, shortness of breath or dizziness. Review of External Medical Records:     Nursing Notes were reviewed. REVIEW OF SYSTEMS    (2-9 systems for level 4, 10 or more for level 5)     Review of Systems   Constitutional:  Negative for chills and fever. HENT:  Negative for congestion, ear pain and sore throat. Eyes:  Negative for pain. Respiratory:  Negative for cough and shortness of breath. Cardiovascular:  Negative for chest pain. Gastrointestinal:  Negative for abdominal pain, diarrhea, nausea and vomiting. Genitourinary:  Negative for dysuria and flank pain. Musculoskeletal:  Negative for myalgias. Skin:  Positive for wound. Negative for rash. Neurological:  Negative for dizziness and headaches. Hematological:  Negative for adenopathy. Except as noted above the remainder of the review of systems was reviewed and negative.        PAST MEDICAL HISTORY     Past Medical History:   Diagnosis Date    Dementia ([address])     with aphasia    GERD (gastroesophageal reflux disease)     Hypothyroidism

## 2023-12-25 NOTE — ED TRIAGE NOTES
Pt arrives today after a GLF on carpeted floor with laceration to R eyebrow after her head hit her glasses. Denies blood thinner use other than a daily baby aspirin.

## 2023-12-25 NOTE — DISCHARGE INSTRUCTIONS
Please have sutures removed in 5-7 days. You can either return here or follow up with your PCP. Continue to monitor for signs of infections such as redness, swelling, drainage, or increasing pain. Return if concern for infection. Can use antibiotic ointment twice daily on wound.

## 2024-01-04 ENCOUNTER — HOSPITAL ENCOUNTER (EMERGENCY)
Facility: HOSPITAL | Age: 79
Discharge: HOME | End: 2024-01-04

## 2024-01-10 ENCOUNTER — TRANSCRIBE ORDERS (OUTPATIENT)
Facility: HOSPITAL | Age: 79
End: 2024-01-10

## 2024-01-10 DIAGNOSIS — Z12.31 VISIT FOR SCREENING MAMMOGRAM: Primary | ICD-10-CM

## 2024-01-24 ENCOUNTER — HOSPITAL ENCOUNTER (OUTPATIENT)
Facility: HOSPITAL | Age: 79
Discharge: HOME | End: 2024-01-27
Payer: MEDICARE

## 2024-01-24 VITALS — WEIGHT: 166 LBS | BODY MASS INDEX: 29.41 KG/M2 | HEIGHT: 63 IN

## 2024-01-24 DIAGNOSIS — Z12.31 VISIT FOR SCREENING MAMMOGRAM: ICD-10-CM

## 2024-01-24 PROCEDURE — 77067 SCR MAMMO BI INCL CAD: CPT

## 2024-02-08 ENCOUNTER — APPOINTMENT (OUTPATIENT)
Facility: HOSPITAL | Age: 79
End: 2024-02-08
Payer: MEDICARE

## 2024-02-08 ENCOUNTER — HOSPITAL ENCOUNTER (EMERGENCY)
Facility: HOSPITAL | Age: 79
Discharge: HOME | End: 2024-02-08
Attending: EMERGENCY MEDICINE
Payer: MEDICARE

## 2024-02-08 VITALS
HEART RATE: 71 BPM | SYSTOLIC BLOOD PRESSURE: 114 MMHG | OXYGEN SATURATION: 94 % | RESPIRATION RATE: 17 BRPM | DIASTOLIC BLOOD PRESSURE: 53 MMHG | TEMPERATURE: 97.8 F
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
## 2024-02-08 DIAGNOSIS — S01.81XA FACIAL LACERATION, INITIAL ENCOUNTER: Primary | ICD-10-CM

## 2024-02-08 PROCEDURE — 70450 CT HEAD/BRAIN W/O DYE: CPT

## 2024-02-08 PROCEDURE — 12011 RPR F/E/E/N/L/M 2.5 CM/<: CPT

## 2024-02-08 PROCEDURE — 99284 EMERGENCY DEPT VISIT MOD MDM: CPT

## 2024-02-08 ASSESSMENT — ENCOUNTER SYMPTOMS
SORE THROAT: 0
SHORTNESS OF BREATH: 0
ABDOMINAL PAIN: 0
BACK PAIN: 0
EYE PAIN: 0
CHEST TIGHTNESS: 0
VOMITING: 0

## 2024-02-08 NOTE — ED PROVIDER NOTES
[facility] EMERGENCY CTR  EMERGENCY DEPARTMENT ENCOUNTER      Pt Name: Celestine Soriano  MRN: [id number]  Birthdate 1945  Date of evaluation: 2024  Provider: Kale Durham MD      HISTORY OF PRESENT ILLNESS      79-year-old female with history dementia presenting to the ER after fall.  Patient had a trip and fall on a curb at a nearby restaurant.  She hit the right side of her face on the pavement.  Has a small laceration over the right eyebrow.  No loss consciousness.  Daughter reports that she is at her neurological baseline.  She is not on anticoagulation.              Nursing Notes were reviewed.    REVIEW OF SYSTEMS         Review of Systems   Constitutional:  Negative for chills and fever.   HENT:  Negative for congestion and sore throat.    Eyes:  Negative for pain and visual disturbance.   Respiratory:  Negative for chest tightness and shortness of breath.    Cardiovascular:  Negative for chest pain and leg swelling.   Gastrointestinal:  Negative for abdominal pain and vomiting.   Genitourinary:  Negative for dysuria.   Musculoskeletal:  Negative for back pain.   Skin:  Positive for wound. Negative for rash.   Neurological:  Negative for weakness and headaches.   All other systems reviewed and are negative.          PAST MEDICAL HISTORY     Past Medical History:   Diagnosis Date    Dementia (HCC)     with aphasia    GERD (gastroesophageal reflux disease)     Hypothyroidism     Intraductal papilloma 2012    Migraine     RA (rheumatoid arthritis) (HCC)     remission         SURGICAL HISTORY       Past Surgical History:   Procedure Laterality Date    BREAST BIOPSY Right     ultrasound core benign-papilloma    BREAST BIOPSY Right     benign surgical- papilloma     SECTION  1983    DILATION AND CURETTAGE OF UTERUS  1970S    OTHER SURGICAL HISTORY      ADITYA RECTAL ABCESS    OTHER SURGICAL HISTORY      last colonoscopy -          CURRENT MEDICATIONS       Previous Medications

## 2024-03-06 ENCOUNTER — APPOINTMENT (OUTPATIENT)
Facility: HOSPITAL | Age: 79
End: 2024-03-06
Payer: MEDICARE

## 2024-03-06 ENCOUNTER — HOSPITAL ENCOUNTER (EMERGENCY)
Facility: HOSPITAL | Age: 79
Discharge: HOME | End: 2024-03-06
Attending: EMERGENCY MEDICINE | Admitting: EMERGENCY MEDICINE
Payer: MEDICARE

## 2024-03-06 VITALS
HEIGHT: 63 IN | WEIGHT: 172.4 LBS | BODY MASS INDEX: 30.55 KG/M2 | HEART RATE: 57 BPM | SYSTOLIC BLOOD PRESSURE: 118 MMHG | DIASTOLIC BLOOD PRESSURE: 46 MMHG | RESPIRATION RATE: 18 BRPM | OXYGEN SATURATION: 95 % | TEMPERATURE: 98.9 F
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
## 2024-03-06 PROCEDURE — 72125 CT NECK SPINE W/O DYE: CPT

## 2024-03-06 PROCEDURE — 73030 X-RAY EXAM OF SHOULDER: CPT

## 2024-03-06 PROCEDURE — 6370000000 HC RX 637 (ALT 250 FOR IP)

## 2024-03-06 PROCEDURE — 99284 EMERGENCY DEPT VISIT MOD MDM: CPT

## 2024-03-06 PROCEDURE — 70450 CT HEAD/BRAIN W/O DYE: CPT

## 2024-03-06 RX ORDER — OMEPRAZOLE 20 MG/1
20 CAPSULE, DELAYED RELEASE ORAL DAILY
COMMUNITY

## 2024-03-06 RX ORDER — ACETAMINOPHEN 500 MG
1000 TABLET ORAL
Status: COMPLETED | OUTPATIENT
Start: 2024-03-06 | End: 2024-03-06

## 2024-03-06 RX ORDER — ACETAMINOPHEN 500 MG
1000 TABLET ORAL
Qty: 60 TABLET | Refills: 0 | Status: SHIPPED | OUTPATIENT
Start: 2024-03-06 | End: 2024-04-05

## 2024-03-06 RX ADMIN — ACETAMINOPHEN 1000 MG: 500 TABLET ORAL at 19:14

## 2024-03-06 NOTE — ED TRIAGE NOTES
Pt c/o falling out of her chair PTA at [facility]. Pt endorses bilateral shoulder pain, states that she \"felll on my back\".

## 2024-03-06 NOTE — ED PROVIDER NOTES
[facility] EMERGENCY [facility]  EMERGENCY DEPARTMENT ENCOUNTER      Pt Name: Celestine Soriano  MRN: [id number]  Birthdate 1945  Date of evaluation: 3/6/2024  Provider: Cesia Dixon PA-C    CHIEF COMPLAINT       Chief Complaint   Patient presents with    Fall    Shoulder Pain         HISTORY OF PRESENT ILLNESS   (Location/Symptom, Timing/Onset, Context/Setting, Quality, Duration, Modifying Factors, Severity)  Note limiting factors.   The history is provided by the patient. History limited by: dementia.       Celestine Soriano is a 79 y.o. female with Hx of breast cancer, stroke, dementia who presents via EMS to [place] ED with cc of fall. Patient reports she was sitting on 2 chairs and her floor is not level so she fell and hit her head. She reports bilateral shoulder pain. On blood thinner, unsure which one. Denies any other concerns.       PCP: Tammy Lynch MD    There are no other complaints, changes or physical findings at this time.    Review of External Medical Records:     Nursing Notes were reviewed.    REVIEW OF SYSTEMS    (2-9 systems for level 4, 10 or more for level 5)     Review of Systems   All other systems reviewed and are negative.      Except as noted above the remainder of the review of systems was reviewed and negative.       PAST MEDICAL HISTORY     Past Medical History:   Diagnosis Date    Dementia (HCC)     with aphasia    GERD (gastroesophageal reflux disease)     Hypothyroidism     Intraductal papilloma 2012    Migraine     RA (rheumatoid arthritis) (HCC)     remission         SURGICAL HISTORY       Past Surgical History:   Procedure Laterality Date    BREAST BIOPSY Right     ultrasound core benign-papilloma    BREAST BIOPSY Right     benign surgical- papilloma     SECTION  1983    DILATION AND CURETTAGE OF UTERUS  1970S    OTHER SURGICAL HISTORY      ADITYA RECTAL ABCESS    OTHER SURGICAL HISTORY      last colonoscopy -          CURRENT MEDICATIONS

## 2024-03-07 NOTE — ED NOTES
Transportation set up with [facility]. ETA [id number].

## 2024-04-29 ENCOUNTER — TELEPHONE (OUTPATIENT)
Age: 79
End: 2024-04-29

## 2024-04-29 NOTE — TELEPHONE ENCOUNTER
Patient's daughter would like a call back to discuss when her mother needs to be taken to the er as she is having difficulty swallowing and occasionally breathing.    Patient's daughter did not indicate if her mother is having issues currently.    PSR is sending this high priority out of an abundance of caution.    **Patient is a new patient and on the wait list to see Dr. Booker in November.**

## 2024-04-29 NOTE — TELEPHONE ENCOUNTER
Called patient's daughter. She stated that she feels like her mother is acutely getting worse. 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 But when it comes to her breathing concern, she wanted to know what signs she should look for. Stated to watch for hoarseness sounds, blue/ purple lips/fingertips. Stated an acute change in breathing should prompt her to go to the ER immediately.

## 2024-05-28 ENCOUNTER — TELEPHONE (OUTPATIENT)
Age: 79
End: 2024-05-28

## 2024-05-28 NOTE — TELEPHONE ENCOUNTER
PSR rescheduled the patient, then called and lvm explaining to the patient why they have been rescheduled.    PSR has asked patient to call back if this new appointment does not work for them.

## 2024-08-14 ENCOUNTER — TELEPHONE (OUTPATIENT)
Age: 79
End: 2024-08-14